# Patient Record
Sex: FEMALE
[De-identification: names, ages, dates, MRNs, and addresses within clinical notes are randomized per-mention and may not be internally consistent; named-entity substitution may affect disease eponyms.]

---

## 2021-11-05 ENCOUNTER — NURSE TRIAGE (OUTPATIENT)
Dept: OTHER | Facility: CLINIC | Age: 71
End: 2021-11-05

## 2021-11-05 NOTE — TELEPHONE ENCOUNTER
Brief description of triage: Patient calling with questions after cortisone injection to her hand yesterday. Stated that she noticed some facial flushing to both cheeks about 24 hours after injection. Facial Flushing - A flushing sensation and redness of the face. This reaction is more common in women and is seen in up to 15 percent of patients. This can begin within a few hours of the injection and may last for a few days. Triage indicates:  Call PCP when office is open. If any worsening of symptoms, call back to speak with nurse. Care advice provided, patient verbalizes understanding; denies any other questions or concerns; instructed to call back for any new or worsening symptoms. This triage is a result of a call to [address] [place] [address]. Please do not respond to the triage nurse through this encounter. Any subsequent communication should be directly with the patient. Reason for Disposition   [1] Caller requesting NON-URGENT health information AND [2] PCP's office is the best resource    Answer Assessment - Initial Assessment Questions  1. REASON FOR CALL or QUESTION: \"What is your reason for calling today? \" or \"How can I best help you? \" or \"What question do you have that I can help answer? \"      How long does facial flushing last with Cortisone injection?     Protocols used: INFORMATION ONLY CALL - NO TRIAGE-ADULT-

## 2023-02-07 PROBLEM — R14.0 BLOATING: Status: ACTIVE | Noted: 2023-02-07

## 2023-02-07 PROBLEM — D35.2 PITUITARY ADENOMA (MULTI): Status: ACTIVE | Noted: 2023-02-07

## 2023-02-07 PROBLEM — G47.00 INSOMNIA: Status: ACTIVE | Noted: 2023-02-07

## 2023-02-07 PROBLEM — R53.83 MALAISE AND FATIGUE: Status: ACTIVE | Noted: 2023-02-07

## 2023-02-07 PROBLEM — E55.9 VITAMIN D DEFICIENCY: Status: ACTIVE | Noted: 2023-02-07

## 2023-02-07 PROBLEM — K80.20 GALL BLADDER STONES: Status: ACTIVE | Noted: 2023-02-07

## 2023-02-07 PROBLEM — R14.2 BELCHING: Status: ACTIVE | Noted: 2023-02-07

## 2023-02-07 PROBLEM — R53.81 MALAISE AND FATIGUE: Status: ACTIVE | Noted: 2023-02-07

## 2023-02-07 PROBLEM — Z85.828 HISTORY OF BASAL CELL CARCINOMA (BCC): Status: ACTIVE | Noted: 2023-02-07

## 2023-02-07 PROBLEM — R10.13 EPIGASTRIC PAIN: Status: ACTIVE | Noted: 2023-02-07

## 2023-02-07 PROBLEM — M18.11 ARTHRITIS OF CARPOMETACARPAL (CMC) JOINT OF RIGHT THUMB: Status: ACTIVE | Noted: 2023-02-07

## 2023-02-07 PROBLEM — M25.531 ACUTE PAIN OF RIGHT WRIST: Status: ACTIVE | Noted: 2023-02-07

## 2023-02-07 PROBLEM — N63.0 BREAST MASS: Status: ACTIVE | Noted: 2023-02-07

## 2023-02-07 PROBLEM — M54.32 LEFT SCIATIC NERVE PAIN: Status: ACTIVE | Noted: 2023-02-07

## 2023-02-07 PROBLEM — R11.0 NAUSEA IN ADULT: Status: ACTIVE | Noted: 2023-02-07

## 2023-02-07 PROBLEM — K21.9 GERD (GASTROESOPHAGEAL REFLUX DISEASE): Status: ACTIVE | Noted: 2023-02-07

## 2023-02-07 PROBLEM — R59.0 CERVICAL LYMPHADENOPATHY: Status: ACTIVE | Noted: 2023-02-07

## 2023-02-07 PROBLEM — N60.19 FIBROCYSTIC DISEASE OF BREAST: Status: ACTIVE | Noted: 2023-02-07

## 2023-02-07 PROBLEM — M75.42 IMPINGEMENT SYNDROME OF LEFT SHOULDER: Status: ACTIVE | Noted: 2023-02-07

## 2023-02-07 PROBLEM — R22.1 NECK MASS: Status: ACTIVE | Noted: 2023-02-07

## 2023-02-07 PROBLEM — M25.559 ARTHRALGIA OF HIP: Status: ACTIVE | Noted: 2023-02-07

## 2023-02-07 PROBLEM — N76.0 VULVOVAGINITIS, ESTROGEN DEFICIENT: Status: ACTIVE | Noted: 2023-02-07

## 2023-02-07 PROBLEM — K76.0 FATTY LIVER DISEASE, NONALCOHOLIC: Status: ACTIVE | Noted: 2023-02-07

## 2023-02-07 PROBLEM — E03.9 HYPOTHYROIDISM: Status: ACTIVE | Noted: 2023-02-07

## 2023-02-07 PROBLEM — J02.9 PHARYNGITIS: Status: ACTIVE | Noted: 2023-02-07

## 2023-02-07 PROBLEM — L29.0 RECTAL ITCHING: Status: ACTIVE | Noted: 2023-02-07

## 2023-02-07 PROBLEM — G50.0 TRIGEMINAL NEURALGIA: Status: ACTIVE | Noted: 2023-02-07

## 2023-02-07 PROBLEM — K82.4 GALLBLADDER POLYP: Status: ACTIVE | Noted: 2023-02-07

## 2023-02-07 PROBLEM — S39.012A LOW BACK STRAIN, INITIAL ENCOUNTER: Status: ACTIVE | Noted: 2023-02-07

## 2023-02-07 PROBLEM — E23.7 ABNORMALITY OF PITUITARY GLAND (MULTI): Status: ACTIVE | Noted: 2023-02-07

## 2023-02-07 PROBLEM — R42 EPISODIC LIGHTHEADEDNESS: Status: ACTIVE | Noted: 2023-02-07

## 2023-02-07 PROBLEM — M25.462 EFFUSION OF LEFT KNEE: Status: ACTIVE | Noted: 2023-02-07

## 2023-02-07 PROBLEM — M85.80 OSTEOPENIA: Status: ACTIVE | Noted: 2023-02-07

## 2023-02-07 PROBLEM — K11.1 SALIVARY GLAND ENLARGEMENT: Status: ACTIVE | Noted: 2023-02-07

## 2023-02-07 PROBLEM — E78.5 HYPERLIPIDEMIA: Status: ACTIVE | Noted: 2023-02-07

## 2023-02-07 PROBLEM — D49.7 PITUITARY TUMOR: Status: ACTIVE | Noted: 2023-02-07

## 2023-02-07 PROBLEM — I25.10 CORONARY ARTERY DISEASE: Status: ACTIVE | Noted: 2023-02-07

## 2023-02-07 PROBLEM — R92.30 DENSE BREAST TISSUE: Status: ACTIVE | Noted: 2023-02-07

## 2023-02-07 PROBLEM — D50.9 ANEMIA, IRON DEFICIENCY: Status: ACTIVE | Noted: 2023-02-07

## 2023-02-07 PROBLEM — M54.41 ACUTE RIGHT-SIDED LOW BACK PAIN WITH RIGHT-SIDED SCIATICA: Status: ACTIVE | Noted: 2023-02-07

## 2023-02-07 PROBLEM — R07.89 CHEST PAIN, ATYPICAL: Status: ACTIVE | Noted: 2023-02-07

## 2023-02-07 PROBLEM — R10.2 PELVIC PAIN IN FEMALE: Status: ACTIVE | Noted: 2023-02-07

## 2023-02-07 PROBLEM — M25.50 ARTHRALGIA: Status: ACTIVE | Noted: 2023-02-07

## 2023-02-07 PROBLEM — U07.1 COVID-19: Status: ACTIVE | Noted: 2023-02-07

## 2023-02-07 RX ORDER — ATORVASTATIN CALCIUM 20 MG/1
20 TABLET, FILM COATED ORAL DAILY
COMMUNITY
Start: 2020-11-17 | End: 2023-09-22 | Stop reason: SDUPTHER

## 2023-02-07 RX ORDER — LEVOTHYROXINE SODIUM 125 UG/1
62.5 TABLET ORAL
COMMUNITY
Start: 2015-12-17 | End: 2023-09-18

## 2023-02-07 RX ORDER — ACETAMINOPHEN 500 MG
100 TABLET ORAL
COMMUNITY
Start: 2013-11-21

## 2023-02-07 RX ORDER — ZOLPIDEM TARTRATE 6.25 MG/1
6.25 TABLET, FILM COATED, EXTENDED RELEASE ORAL NIGHTLY PRN
COMMUNITY
Start: 2020-05-13 | End: 2023-10-14

## 2023-02-07 RX ORDER — NEBIVOLOL 2.5 MG/1
2.5 TABLET ORAL NIGHTLY
COMMUNITY
Start: 2022-01-24 | End: 2024-03-14 | Stop reason: ALTCHOICE

## 2023-04-03 ENCOUNTER — OFFICE VISIT (OUTPATIENT)
Dept: PRIMARY CARE | Facility: CLINIC | Age: 73
End: 2023-04-03
Payer: MEDICARE

## 2023-04-03 VITALS
SYSTOLIC BLOOD PRESSURE: 128 MMHG | RESPIRATION RATE: 19 BRPM | HEART RATE: 68 BPM | DIASTOLIC BLOOD PRESSURE: 70 MMHG | HEIGHT: 63 IN | WEIGHT: 139 LBS | TEMPERATURE: 98.5 F | BODY MASS INDEX: 24.63 KG/M2

## 2023-04-03 DIAGNOSIS — E03.9 ACQUIRED HYPOTHYROIDISM: ICD-10-CM

## 2023-04-03 DIAGNOSIS — N60.19 FIBROCYSTIC BREAST DISEASE (FCBD), UNSPECIFIED LATERALITY: ICD-10-CM

## 2023-04-03 DIAGNOSIS — M85.80 OSTEOPENIA, UNSPECIFIED LOCATION: ICD-10-CM

## 2023-04-03 DIAGNOSIS — Z12.31 ENCOUNTER FOR SCREENING MAMMOGRAM FOR MALIGNANT NEOPLASM OF BREAST: ICD-10-CM

## 2023-04-03 DIAGNOSIS — E78.2 MIXED HYPERLIPIDEMIA: ICD-10-CM

## 2023-04-03 DIAGNOSIS — E55.9 VITAMIN D DEFICIENCY: ICD-10-CM

## 2023-04-03 DIAGNOSIS — Z78.0 POSTMENOPAUSAL: ICD-10-CM

## 2023-04-03 DIAGNOSIS — R51.9 NONINTRACTABLE EPISODIC HEADACHE, UNSPECIFIED HEADACHE TYPE: ICD-10-CM

## 2023-04-03 DIAGNOSIS — R53.83 FATIGUE, UNSPECIFIED TYPE: ICD-10-CM

## 2023-04-03 DIAGNOSIS — K76.0 FATTY LIVER DISEASE, NONALCOHOLIC: ICD-10-CM

## 2023-04-03 DIAGNOSIS — Z00.00 MEDICARE ANNUAL WELLNESS VISIT, SUBSEQUENT: Primary | ICD-10-CM

## 2023-04-03 DIAGNOSIS — I25.10 CORONARY ARTERY DISEASE INVOLVING NATIVE CORONARY ARTERY OF NATIVE HEART WITHOUT ANGINA PECTORIS: ICD-10-CM

## 2023-04-03 DIAGNOSIS — F51.04 PSYCHOPHYSIOLOGICAL INSOMNIA: ICD-10-CM

## 2023-04-03 DIAGNOSIS — D35.2 PITUITARY ADENOMA (MULTI): ICD-10-CM

## 2023-04-03 PROBLEM — R10.2 PELVIC PAIN IN FEMALE: Status: RESOLVED | Noted: 2023-02-07 | Resolved: 2023-04-03

## 2023-04-03 PROBLEM — M25.559 ARTHRALGIA OF HIP: Status: RESOLVED | Noted: 2023-02-07 | Resolved: 2023-04-03

## 2023-04-03 PROBLEM — M54.41 ACUTE RIGHT-SIDED LOW BACK PAIN WITH RIGHT-SIDED SCIATICA: Status: RESOLVED | Noted: 2023-02-07 | Resolved: 2023-04-03

## 2023-04-03 PROBLEM — R59.0 CERVICAL LYMPHADENOPATHY: Status: RESOLVED | Noted: 2023-02-07 | Resolved: 2023-04-03

## 2023-04-03 PROBLEM — R14.0 BLOATING: Status: RESOLVED | Noted: 2023-02-07 | Resolved: 2023-04-03

## 2023-04-03 PROBLEM — M54.32 LEFT SCIATIC NERVE PAIN: Status: RESOLVED | Noted: 2023-02-07 | Resolved: 2023-04-03

## 2023-04-03 PROBLEM — R53.81 MALAISE AND FATIGUE: Status: RESOLVED | Noted: 2023-02-07 | Resolved: 2023-04-03

## 2023-04-03 PROBLEM — G50.0 TRIGEMINAL NEURALGIA: Status: RESOLVED | Noted: 2023-02-07 | Resolved: 2023-04-03

## 2023-04-03 PROBLEM — N76.0 VULVOVAGINITIS, ESTROGEN DEFICIENT: Status: RESOLVED | Noted: 2023-02-07 | Resolved: 2023-04-03

## 2023-04-03 PROBLEM — R14.2 BELCHING: Status: RESOLVED | Noted: 2023-02-07 | Resolved: 2023-04-03

## 2023-04-03 PROBLEM — K80.20 GALL BLADDER STONES: Status: RESOLVED | Noted: 2023-02-07 | Resolved: 2023-04-03

## 2023-04-03 PROBLEM — U07.1 COVID-19: Status: RESOLVED | Noted: 2023-02-07 | Resolved: 2023-04-03

## 2023-04-03 PROBLEM — R11.0 NAUSEA IN ADULT: Status: RESOLVED | Noted: 2023-02-07 | Resolved: 2023-04-03

## 2023-04-03 PROBLEM — R10.13 EPIGASTRIC PAIN: Status: RESOLVED | Noted: 2023-02-07 | Resolved: 2023-04-03

## 2023-04-03 PROBLEM — M75.42 IMPINGEMENT SYNDROME OF LEFT SHOULDER: Status: RESOLVED | Noted: 2023-02-07 | Resolved: 2023-04-03

## 2023-04-03 PROBLEM — E23.7 ABNORMALITY OF PITUITARY GLAND (MULTI): Status: RESOLVED | Noted: 2023-02-07 | Resolved: 2023-04-03

## 2023-04-03 PROBLEM — K11.1 SALIVARY GLAND ENLARGEMENT: Status: RESOLVED | Noted: 2023-02-07 | Resolved: 2023-04-03

## 2023-04-03 PROBLEM — M25.531 ACUTE PAIN OF RIGHT WRIST: Status: RESOLVED | Noted: 2023-02-07 | Resolved: 2023-04-03

## 2023-04-03 PROBLEM — L29.0 RECTAL ITCHING: Status: RESOLVED | Noted: 2023-02-07 | Resolved: 2023-04-03

## 2023-04-03 PROBLEM — M25.50 ARTHRALGIA: Status: RESOLVED | Noted: 2023-02-07 | Resolved: 2023-04-03

## 2023-04-03 PROBLEM — R22.1 NECK MASS: Status: RESOLVED | Noted: 2023-02-07 | Resolved: 2023-04-03

## 2023-04-03 PROBLEM — R07.89 CHEST PAIN, ATYPICAL: Status: RESOLVED | Noted: 2023-02-07 | Resolved: 2023-04-03

## 2023-04-03 PROBLEM — D50.9 ANEMIA, IRON DEFICIENCY: Status: RESOLVED | Noted: 2023-02-07 | Resolved: 2023-04-03

## 2023-04-03 PROBLEM — K21.9 GERD (GASTROESOPHAGEAL REFLUX DISEASE): Status: RESOLVED | Noted: 2023-02-07 | Resolved: 2023-04-03

## 2023-04-03 PROBLEM — S39.012A LOW BACK STRAIN, INITIAL ENCOUNTER: Status: RESOLVED | Noted: 2023-02-07 | Resolved: 2023-04-03

## 2023-04-03 PROBLEM — J02.9 PHARYNGITIS: Status: RESOLVED | Noted: 2023-02-07 | Resolved: 2023-04-03

## 2023-04-03 PROCEDURE — 1159F MED LIST DOCD IN RCRD: CPT | Performed by: INTERNAL MEDICINE

## 2023-04-03 PROCEDURE — 1160F RVW MEDS BY RX/DR IN RCRD: CPT | Performed by: INTERNAL MEDICINE

## 2023-04-03 PROCEDURE — 82607 VITAMIN B-12: CPT

## 2023-04-03 PROCEDURE — 80061 LIPID PANEL: CPT

## 2023-04-03 PROCEDURE — 81003 URINALYSIS AUTO W/O SCOPE: CPT

## 2023-04-03 PROCEDURE — 1036F TOBACCO NON-USER: CPT | Performed by: INTERNAL MEDICINE

## 2023-04-03 PROCEDURE — 36415 COLL VENOUS BLD VENIPUNCTURE: CPT | Performed by: INTERNAL MEDICINE

## 2023-04-03 PROCEDURE — 1170F FXNL STATUS ASSESSED: CPT | Performed by: INTERNAL MEDICINE

## 2023-04-03 PROCEDURE — 80053 COMPREHEN METABOLIC PANEL: CPT

## 2023-04-03 PROCEDURE — 82306 VITAMIN D 25 HYDROXY: CPT

## 2023-04-03 PROCEDURE — 84443 ASSAY THYROID STIM HORMONE: CPT

## 2023-04-03 PROCEDURE — 85027 COMPLETE CBC AUTOMATED: CPT

## 2023-04-03 PROCEDURE — G0439 PPPS, SUBSEQ VISIT: HCPCS | Performed by: INTERNAL MEDICINE

## 2023-04-03 PROCEDURE — 99214 OFFICE O/P EST MOD 30 MIN: CPT | Performed by: INTERNAL MEDICINE

## 2023-04-03 ASSESSMENT — ENCOUNTER SYMPTOMS
WEAKNESS: 0
ANAL BLEEDING: 0
BACK PAIN: 0
ACTIVITY CHANGE: 0
COUGH: 0
DYSURIA: 0
PHOTOPHOBIA: 0
STRIDOR: 0
FREQUENCY: 0
DIZZINESS: 0
TROUBLE SWALLOWING: 0
HYPERACTIVE: 0
EYE DISCHARGE: 0
HEMATURIA: 0
ABDOMINAL PAIN: 0
CONFUSION: 0
DEPRESSION: 0
LOSS OF SENSATION IN FEET: 0
COLOR CHANGE: 0
SINUS PAIN: 0
CHOKING: 0
POLYDIPSIA: 0
ADENOPATHY: 0
FACIAL SWELLING: 0
UNEXPECTED WEIGHT CHANGE: 0
BRUISES/BLEEDS EASILY: 0
VOICE CHANGE: 0
NAUSEA: 0
SINUS PRESSURE: 0
FLANK PAIN: 0
EYE REDNESS: 0
DYSPHORIC MOOD: 0
FATIGUE: 1
JOINT SWELLING: 0
WOUND: 0
CHILLS: 0
APPETITE CHANGE: 0
DIARRHEA: 0
ARTHRALGIAS: 0
HEADACHES: 1
LIGHT-HEADEDNESS: 0
POLYPHAGIA: 0
EYE ITCHING: 0
RHINORRHEA: 0
CONSTIPATION: 0
OCCASIONAL FEELINGS OF UNSTEADINESS: 0
EYE PAIN: 0
WHEEZING: 0
NUMBNESS: 0
MYALGIAS: 0
BLOOD IN STOOL: 0
DIFFICULTY URINATING: 0
FEVER: 0
PALPITATIONS: 0
CHEST TIGHTNESS: 0
DECREASED CONCENTRATION: 0
SEIZURES: 0
APNEA: 0
SLEEP DISTURBANCE: 0
ABDOMINAL DISTENTION: 0
NERVOUS/ANXIOUS: 0
SHORTNESS OF BREATH: 0
VOMITING: 0
DIAPHORESIS: 0
RECTAL PAIN: 0
SORE THROAT: 0
NECK PAIN: 0

## 2023-04-03 ASSESSMENT — PATIENT HEALTH QUESTIONNAIRE - PHQ9
1. LITTLE INTEREST OR PLEASURE IN DOING THINGS: NOT AT ALL
2. FEELING DOWN, DEPRESSED OR HOPELESS: NOT AT ALL
SUM OF ALL RESPONSES TO PHQ9 QUESTIONS 1 AND 2: 0

## 2023-04-03 ASSESSMENT — ACTIVITIES OF DAILY LIVING (ADL)
DOING_HOUSEWORK: INDEPENDENT
BATHING: INDEPENDENT
GROCERY_SHOPPING: INDEPENDENT
MANAGING_FINANCES: INDEPENDENT
DRESSING: INDEPENDENT
TAKING_MEDICATION: INDEPENDENT

## 2023-04-03 NOTE — PATIENT INSTRUCTIONS
Try Melatonin 2.5 or 5 mg when you wake at 2:30 in the AM to get you back to sleep.    Can stop Bystolic and monitor your BP at home with goal of <140/90 consistently   We did blood work today and will call if anything is abnormal  Your bone density is due in 10/2023-order is already in   Get your mammogram done when able-order is in  See GYN as scheduled  Continue other meds as directed-call when refills needed  Follow up in 1 year-sooner if needed

## 2023-04-03 NOTE — PROGRESS NOTES
Subjective   Reason for Visit: Umu Haque is an 72 y.o. female here for a Medicare Wellness visit.          Reviewed all medications by prescribing practitioner or clinical pharmacist (such as prescriptions, OTCs, herbal therapies and supplements) and documented in the medical record.    HPI    Pt here for MWV.  She feels her energy is low.  She continues to have insomnia and only uses   Ambien every now and then.  She cannot stay asleep.  She will wake 2:30-3 am and stay up for hours at times.      She had mammogram in 4/2022 that was normal.   She has no breast or urinary issues.  She has no pelvic or urinary issues at this time.  She has an upcoming appointment with Dr. Rain for her GYN care (summer of 2023).    She had her colonoscopy done in 7/2021 that showed diverticuli of the ascending colon.   She is moving bowels without issues.  No current GERD.      She had bone density in 10/2021 that showed osteopenia.  She is on Vitamin D but not calcium.  She is very active.  No falls.      She sees Derm regularly and had recent exam with some removal of skin lesions.      She has been having almost a constant pressure sensation above and below right eye for last 2 months.  No vision or auditory changes.  She does not have sinus pain/pressure or symptoms.  She does not take medication for the discomfort.        Patient Care Team:  Liyah QUEZADA DO as PCP - General  Liyah QUEZADA DO as PCP - [facility] Attributed Provider     Review of Systems   Constitutional:  Positive for fatigue. Negative for activity change, appetite change, chills, diaphoresis, fever and unexpected weight change.   HENT:  Negative for congestion, dental problem, ear discharge, ear pain, facial swelling, hearing loss, nosebleeds, postnasal drip, rhinorrhea, sinus pressure, sinus pain, sneezing, sore throat, tinnitus, trouble swallowing and voice change.    Eyes:  Negative for photophobia, pain, discharge, redness, itching and visual  "disturbance.   Respiratory:  Negative for apnea, cough, choking, chest tightness, shortness of breath, wheezing and stridor.    Cardiovascular:  Negative for chest pain, palpitations and leg swelling.   Gastrointestinal:  Negative for abdominal distention, abdominal pain, anal bleeding, blood in stool, constipation, diarrhea, nausea, rectal pain and vomiting.   Endocrine: Negative for cold intolerance, heat intolerance, polydipsia, polyphagia and polyuria.   Genitourinary:  Negative for decreased urine volume, difficulty urinating, dysuria, flank pain, frequency, genital sores, hematuria, pelvic pain, urgency, vaginal bleeding, vaginal discharge and vaginal pain.   Musculoskeletal:  Negative for arthralgias, back pain, joint swelling, myalgias and neck pain.   Skin:  Negative for color change, rash and wound.   Allergic/Immunologic: Negative for environmental allergies.   Neurological:  Positive for headaches. Negative for dizziness, seizures, syncope, weakness, light-headedness and numbness.   Hematological:  Negative for adenopathy. Does not bruise/bleed easily.   Psychiatric/Behavioral:  Negative for confusion, decreased concentration, dysphoric mood and sleep disturbance. The patient is not nervous/anxious and is not hyperactive.        Objective   Vitals:  /70   Pulse 68   Temp 36.9 °C (98.5 °F)   Resp 19   Ht 1.6 m (5' 3\")   Wt 63 kg (139 lb)   BMI 24.62 kg/m²       Physical Exam  Constitutional:       Appearance: Normal appearance.   HENT:      Head: Normocephalic and atraumatic.      Right Ear: Tympanic membrane, ear canal and external ear normal. There is no impacted cerumen.      Left Ear: Tympanic membrane, ear canal and external ear normal. There is no impacted cerumen.      Nose: Nose normal. No congestion or rhinorrhea.      Mouth/Throat:      Mouth: Mucous membranes are moist.      Pharynx: Oropharynx is clear. No oropharyngeal exudate or posterior oropharyngeal erythema.   Eyes:      " Extraocular Movements: Extraocular movements intact.      Conjunctiva/sclera: Conjunctivae normal.      Pupils: Pupils are equal, round, and reactive to light.   Neck:      Vascular: No carotid bruit.   Cardiovascular:      Rate and Rhythm: Normal rate and regular rhythm.      Pulses: Normal pulses.      Heart sounds: Normal heart sounds. No murmur heard.  Pulmonary:      Effort: Pulmonary effort is normal. No respiratory distress.      Breath sounds: Normal breath sounds. No wheezing, rhonchi or rales.   Abdominal:      General: Abdomen is flat. Bowel sounds are normal. There is no distension.      Palpations: Abdomen is soft.      Tenderness: There is no abdominal tenderness.      Hernia: No hernia is present.   Musculoskeletal:         General: No swelling or tenderness. Normal range of motion.      Cervical back: Normal range of motion and neck supple.      Right lower leg: No edema.      Left lower leg: No edema.   Lymphadenopathy:      Cervical: No cervical adenopathy.   Skin:     General: Skin is warm and dry.      Findings: No lesion or rash.   Neurological:      General: No focal deficit present.      Mental Status: She is alert and oriented to person, place, and time.      Cranial Nerves: No cranial nerve deficit.      Sensory: No sensory deficit.      Motor: No weakness.   Psychiatric:         Mood and Affect: Mood normal.         Behavior: Behavior normal.         Thought Content: Thought content normal.         Judgment: Judgment normal.         Assessment/Plan   Problem List Items Addressed This Visit          Nervous    Insomnia    Current Assessment & Plan     Uses Ambien very PRN  Still getting up in middle of night  Recommend use of Melatonin 2.5-5 mg when getting up in middle of night to help her get back to sleep          Pituitary adenoma (CMS/HCC)    Current Assessment & Plan     Follows with neurosurgeon  Has had multiple MRI's that are stable-most recently told no more MRI's needed              Circulatory    Coronary artery disease    Relevant Orders    CBC    Comprehensive Metabolic Panel    Urinalysis with Reflex Microscopic    Follow Up In Primary Care       Digestive    Fatty liver disease, nonalcoholic    Relevant Orders    Follow Up In Primary Care       Musculoskeletal    Osteopenia    Current Assessment & Plan     On Vitamin D   Due for repeat in 10/2023  Exercises regularly             Endocrine/Metabolic    Hypothyroidism    Current Assessment & Plan     On medication   Repeat TSH today         Relevant Orders    Thyroid Stimulating Hormone    Follow Up In Primary Care    Vitamin D deficiency    Relevant Orders    Vitamin D, Total    Follow Up In Primary Care    Vitamin D 25-Hydroxy,Total (for eval of Vitamin D levels)       Other    Fibrocystic disease of breast    Hyperlipidemia    Relevant Orders    Lipid Panel     Other Visit Diagnoses       Medicare annual wellness visit, subsequent    -  Primary    Relevant Orders    Follow Up In Primary Care    Fatigue, unspecified type        Relevant Orders    Vitamin B12    Postmenopausal        Relevant Orders    XR DEXA bone density    Encounter for screening mammogram for malignant neoplasm of breast        Relevant Orders    BI mammo bilateral screening tomosynthesis    Nonintractable episodic headache, unspecified headache type

## 2023-04-03 NOTE — ASSESSMENT & PLAN NOTE
Uses Ambien very PRN  Still getting up in middle of night  Recommend use of Melatonin 2.5-5 mg when getting up in middle of night to help her get back to sleep

## 2023-04-03 NOTE — ASSESSMENT & PLAN NOTE
Follows with neurosurgeon  Has had multiple MRI's that are stable-most recently told no more MRI's needed

## 2023-04-04 LAB
ALANINE AMINOTRANSFERASE (SGPT) (U/L) IN SER/PLAS: 18 U/L (ref 7–45)
ALBUMIN (G/DL) IN SER/PLAS: 4.4 G/DL (ref 3.4–5)
ALKALINE PHOSPHATASE (U/L) IN SER/PLAS: 70 U/L (ref 33–136)
ANION GAP IN SER/PLAS: 13 MMOL/L (ref 10–20)
APPEARANCE, URINE: CLEAR
ASPARTATE AMINOTRANSFERASE (SGOT) (U/L) IN SER/PLAS: 21 U/L (ref 9–39)
BILIRUBIN TOTAL (MG/DL) IN SER/PLAS: 0.9 MG/DL (ref 0–1.2)
BILIRUBIN, URINE: NEGATIVE
BLOOD, URINE: NEGATIVE
CALCIDIOL (25 OH VITAMIN D3) (NG/ML) IN SER/PLAS: 45 NG/ML
CALCIUM (MG/DL) IN SER/PLAS: 9.7 MG/DL (ref 8.6–10.6)
CARBON DIOXIDE, TOTAL (MMOL/L) IN SER/PLAS: 29 MMOL/L (ref 21–32)
CHLORIDE (MMOL/L) IN SER/PLAS: 104 MMOL/L (ref 98–107)
CHOLESTEROL (MG/DL) IN SER/PLAS: 161 MG/DL (ref 0–199)
CHOLESTEROL IN HDL (MG/DL) IN SER/PLAS: 66.9 MG/DL
CHOLESTEROL/HDL RATIO: 2.4
COBALAMIN (VITAMIN B12) (PG/ML) IN SER/PLAS: 324 PG/ML (ref 211–911)
COLOR, URINE: NORMAL
CREATININE (MG/DL) IN SER/PLAS: 0.75 MG/DL (ref 0.5–1.05)
ERYTHROCYTE DISTRIBUTION WIDTH (RATIO) BY AUTOMATED COUNT: 12.3 % (ref 11.5–14.5)
ERYTHROCYTE MEAN CORPUSCULAR HEMOGLOBIN CONCENTRATION (G/DL) BY AUTOMATED: 32.3 G/DL (ref 32–36)
ERYTHROCYTE MEAN CORPUSCULAR VOLUME (FL) BY AUTOMATED COUNT: 99 FL (ref 80–100)
ERYTHROCYTES (10*6/UL) IN BLOOD BY AUTOMATED COUNT: 4.37 X10E12/L (ref 4–5.2)
GFR FEMALE: 84 ML/MIN/1.73M2
GLUCOSE (MG/DL) IN SER/PLAS: 96 MG/DL (ref 74–99)
GLUCOSE, URINE: NEGATIVE MG/DL
HEMATOCRIT (%) IN BLOOD BY AUTOMATED COUNT: 43.4 % (ref 36–46)
HEMOGLOBIN (G/DL) IN BLOOD: 14 G/DL (ref 12–16)
KETONES, URINE: NEGATIVE MG/DL
LDL: 70 MG/DL (ref 0–99)
LEUKOCYTE ESTERASE, URINE: NEGATIVE
LEUKOCYTES (10*3/UL) IN BLOOD BY AUTOMATED COUNT: 4.3 X10E9/L (ref 4.4–11.3)
NITRITE, URINE: NEGATIVE
NRBC (PER 100 WBCS) BY AUTOMATED COUNT: 0 /100 WBC (ref 0–0)
PH, URINE: 7 (ref 5–8)
PLATELETS (10*3/UL) IN BLOOD AUTOMATED COUNT: 183 X10E9/L (ref 150–450)
POTASSIUM (MMOL/L) IN SER/PLAS: 5 MMOL/L (ref 3.5–5.3)
PROTEIN TOTAL: 6.5 G/DL (ref 6.4–8.2)
PROTEIN, URINE: NEGATIVE MG/DL
SODIUM (MMOL/L) IN SER/PLAS: 141 MMOL/L (ref 136–145)
SPECIFIC GRAVITY, URINE: 1 (ref 1–1.03)
THYROTROPIN (MIU/L) IN SER/PLAS BY DETECTION LIMIT <= 0.05 MIU/L: 2.02 MIU/L (ref 0.44–3.98)
TRIGLYCERIDE (MG/DL) IN SER/PLAS: 119 MG/DL (ref 0–149)
UREA NITROGEN (MG/DL) IN SER/PLAS: 14 MG/DL (ref 6–23)
UROBILINOGEN, URINE: <2 MG/DL (ref 0–1.9)
VLDL: 24 MG/DL (ref 0–40)

## 2023-09-18 DIAGNOSIS — E03.9 ACQUIRED HYPOTHYROIDISM: ICD-10-CM

## 2023-09-18 RX ORDER — LEVOTHYROXINE SODIUM 125 UG/1
62.5 TABLET ORAL DAILY
Qty: 45 TABLET | Refills: 1 | Status: SHIPPED | OUTPATIENT
Start: 2023-09-18 | End: 2023-09-22 | Stop reason: SDUPTHER

## 2023-09-22 DIAGNOSIS — E03.9 ACQUIRED HYPOTHYROIDISM: ICD-10-CM

## 2023-09-22 DIAGNOSIS — E78.5 HYPERLIPIDEMIA, UNSPECIFIED HYPERLIPIDEMIA TYPE: ICD-10-CM

## 2023-09-22 RX ORDER — ATORVASTATIN CALCIUM 20 MG/1
20 TABLET, FILM COATED ORAL DAILY
Qty: 90 TABLET | Refills: 1 | Status: SHIPPED | OUTPATIENT
Start: 2023-09-22 | End: 2024-04-03 | Stop reason: SDUPTHER

## 2023-09-22 RX ORDER — LEVOTHYROXINE SODIUM 125 UG/1
62.5 TABLET ORAL DAILY
Qty: 45 TABLET | Refills: 1 | Status: SHIPPED | OUTPATIENT
Start: 2023-09-22 | End: 2024-03-25 | Stop reason: SDUPTHER

## 2023-10-13 ENCOUNTER — TELEPHONE (OUTPATIENT)
Dept: PRIMARY CARE | Facility: CLINIC | Age: 73
End: 2023-10-13
Payer: MEDICARE

## 2023-10-13 DIAGNOSIS — G47.00 INSOMNIA, UNSPECIFIED TYPE: Primary | ICD-10-CM

## 2023-10-13 NOTE — TELEPHONE ENCOUNTER
Patient called for a Zolpidem refill. She said you had mentioned calling in a new dose of 1/2 tablet of the 6.25 and without extended release so she can use as needed in the middle of the night.

## 2023-10-14 RX ORDER — ZOLPIDEM TARTRATE 5 MG/1
5 TABLET ORAL NIGHTLY PRN
Qty: 45 TABLET | Refills: 0 | Status: SHIPPED | OUTPATIENT
Start: 2023-10-14 | End: 2023-12-13

## 2023-10-14 NOTE — TELEPHONE ENCOUNTER
I sent in the 5 mg dose instead and she can take 1 tablet before bed and then 1/2 tablet in middle of night if needed

## 2023-10-23 ENCOUNTER — ANCILLARY PROCEDURE (OUTPATIENT)
Dept: RADIOLOGY | Facility: CLINIC | Age: 73
End: 2023-10-23
Payer: MEDICARE

## 2023-10-23 DIAGNOSIS — Z78.0 ASYMPTOMATIC MENOPAUSAL STATE: ICD-10-CM

## 2023-10-23 PROCEDURE — 77080 DXA BONE DENSITY AXIAL: CPT

## 2023-10-23 PROCEDURE — 77080 DXA BONE DENSITY AXIAL: CPT | Performed by: RADIOLOGY

## 2024-02-14 ENCOUNTER — TELEPHONE (OUTPATIENT)
Dept: PRIMARY CARE | Facility: CLINIC | Age: 74
End: 2024-02-14
Payer: MEDICARE

## 2024-02-14 DIAGNOSIS — F51.01 PRIMARY INSOMNIA: Primary | ICD-10-CM

## 2024-02-14 NOTE — TELEPHONE ENCOUNTER
Umu called today for a suggestion to a sleep specialist for her insomnia. She said you have discussed with her before.     Please advise

## 2024-02-14 NOTE — TELEPHONE ENCOUNTER
Umu did try Dr Moore however she Is not excepting new patients can you enter a referral and she will call to schedule.

## 2024-03-14 ENCOUNTER — OFFICE VISIT (OUTPATIENT)
Dept: PRIMARY CARE | Facility: CLINIC | Age: 74
End: 2024-03-14
Payer: MEDICARE

## 2024-03-14 VITALS
BODY MASS INDEX: 24.92 KG/M2 | WEIGHT: 145.2 LBS | HEART RATE: 76 BPM | DIASTOLIC BLOOD PRESSURE: 77 MMHG | SYSTOLIC BLOOD PRESSURE: 136 MMHG

## 2024-03-14 DIAGNOSIS — R26.89 BALANCE PROBLEM: Primary | ICD-10-CM

## 2024-03-14 PROCEDURE — 1159F MED LIST DOCD IN RCRD: CPT | Performed by: INTERNAL MEDICINE

## 2024-03-14 PROCEDURE — 1036F TOBACCO NON-USER: CPT | Performed by: INTERNAL MEDICINE

## 2024-03-14 PROCEDURE — 1160F RVW MEDS BY RX/DR IN RCRD: CPT | Performed by: INTERNAL MEDICINE

## 2024-03-14 PROCEDURE — 99213 OFFICE O/P EST LOW 20 MIN: CPT | Performed by: INTERNAL MEDICINE

## 2024-03-14 RX ORDER — ELECTROLYTES/DEXTROSE
1 SOLUTION, ORAL ORAL
COMMUNITY
End: 2024-04-03 | Stop reason: WASHOUT

## 2024-03-14 RX ORDER — LANOLIN ALCOHOL/MO/W.PET/CERES
1000 CREAM (GRAM) TOPICAL DAILY
COMMUNITY

## 2024-03-14 ASSESSMENT — ENCOUNTER SYMPTOMS
SPEECH DIFFICULTY: 0
DYSPHORIC MOOD: 0
BRUISES/BLEEDS EASILY: 0
HYPERACTIVE: 0
CONFUSION: 0
NUMBNESS: 0
FACIAL ASYMMETRY: 0
AGITATION: 0
RHINORRHEA: 0
HEADACHES: 0
DECREASED CONCENTRATION: 0
ADENOPATHY: 0
TROUBLE SWALLOWING: 0
HALLUCINATIONS: 0
VOICE CHANGE: 0
SINUS PRESSURE: 0
WEAKNESS: 0
TREMORS: 0
SEIZURES: 0
SINUS PAIN: 0
LIGHT-HEADEDNESS: 0
SLEEP DISTURBANCE: 0
SORE THROAT: 0
NERVOUS/ANXIOUS: 0
DIZZINESS: 0

## 2024-03-14 NOTE — PATIENT INSTRUCTIONS
Symptoms and exam consistent with BPPV  Would get into vestibular PT and see if this helps  Keep ENT appointment for now   Watch salt and caffeine in your diet as these can cause worsening issues  Watch sinuses-if you feel congested this can make it worse-would use nasal spray such as Flonase  Follow up when scheduled     
12-Apr-2021 14:59

## 2024-03-14 NOTE — ASSESSMENT & PLAN NOTE
Most likely BPPV  Recommend vestibular PT  Can use nasal spray to help with any congestion  Has ENT appt scheduled late April

## 2024-03-14 NOTE — PROGRESS NOTES
"Subjective   Patient ID: Umu Haque is a 73 y.o. female who presents for No chief complaint on file..    HPI  Pt here in acute visit.  She has felt off balanced in the last 1-2 months.  She will walk and bump into a wall.  More likely she will drift to the right but something she has noted to the left.  The feeling is quick and fleeting.  She compares it to feeling slightly \"drunk\".  She notes this sensation when walking and turning or when her head is down.  No headaches, vertigo or lightheaded feeling.  She did have a bunch of dental work between 12/2023-1/2024.  She was on medrol dose pack/abx.  She was on amoxicillin first then metronidazole as well as cipro-that ended 1/29/24.  She feels the symptoms have come on since that time she believes.  She did schedule an ENT appointment but this is not till April.    She saw her cardiologist a few weeks ago and he suggested her stop the atorvastatin which she did for 3 full weeks.  She didn't notice any changes in symptoms so she has since restarted it.  She has BP readings as low as 105-120's.  She has element of white coat syndrome when in office.      Review of Systems   HENT:  Positive for dental problem. Negative for congestion, drooling, ear discharge, ear pain, hearing loss, nosebleeds, postnasal drip, rhinorrhea, sinus pressure, sinus pain, sneezing, sore throat, tinnitus, trouble swallowing and voice change.    Neurological:  Negative for dizziness, tremors, seizures, syncope, facial asymmetry, speech difficulty, weakness, light-headedness, numbness and headaches.        Balance changes    Hematological:  Negative for adenopathy. Does not bruise/bleed easily.   Psychiatric/Behavioral:  Negative for agitation, behavioral problems, confusion, decreased concentration, dysphoric mood, hallucinations, self-injury, sleep disturbance and suicidal ideas. The patient is not nervous/anxious and is not hyperactive.        Objective   /77 Comment: home  " Pulse 76   Wt 65.9 kg (145 lb 3.2 oz)   BMI 24.92 kg/m²    Physical Exam  Constitutional:       Appearance: Normal appearance.   HENT:      Right Ear: Tympanic membrane normal.      Left Ear: Tympanic membrane normal.      Nose: No congestion or rhinorrhea.      Mouth/Throat:      Pharynx: Oropharynx is clear. No oropharyngeal exudate or posterior oropharyngeal erythema.   Neck:      Vascular: No carotid bruit.   Cardiovascular:      Rate and Rhythm: Normal rate and regular rhythm.      Pulses: Normal pulses.      Heart sounds: Normal heart sounds.   Pulmonary:      Effort: Pulmonary effort is normal. No respiratory distress.      Breath sounds: Normal breath sounds. No stridor. No wheezing, rhonchi or rales.   Chest:      Chest wall: No tenderness.   Musculoskeletal:         General: No swelling, tenderness, deformity or signs of injury. Normal range of motion.      Cervical back: Normal range of motion and neck supple. No rigidity or tenderness.      Right lower leg: No edema.      Left lower leg: No edema.   Lymphadenopathy:      Cervical: No cervical adenopathy.   Neurological:      General: No focal deficit present.      Mental Status: She is alert and oriented to person, place, and time. Mental status is at baseline.      Cranial Nerves: No cranial nerve deficit.      Sensory: No sensory deficit.      Motor: No weakness.      Coordination: Coordination normal.      Gait: Gait normal.      Deep Tendon Reflexes: Reflexes normal.      Comments: [place] symptoms noted-mild in intensity when head was straight and turned to the left   Negative Rhomberg    Psychiatric:         Mood and Affect: Mood normal.         Assessment/Plan   Problem List Items Addressed This Visit       Balance problem - Primary     Most likely BPPV  Recommend vestibular PT  Can use nasal spray to help with any congestion  Has ENT appt scheduled late April            Relevant Orders    Referral to Physical Therapy

## 2024-03-21 ENCOUNTER — CLINICAL SUPPORT (OUTPATIENT)
Dept: AUDIOLOGY | Facility: CLINIC | Age: 74
End: 2024-03-21
Payer: MEDICARE

## 2024-03-21 DIAGNOSIS — R42 DIZZINESS AND GIDDINESS: Primary | ICD-10-CM

## 2024-03-21 DIAGNOSIS — H91.92 HIGH FREQUENCY HEARING LOSS, LEFT: ICD-10-CM

## 2024-03-21 PROCEDURE — 92557 COMPREHENSIVE HEARING TEST: CPT | Performed by: AUDIOLOGIST

## 2024-03-21 PROCEDURE — 92550 TYMPANOMETRY & REFLEX THRESH: CPT | Performed by: AUDIOLOGIST

## 2024-03-21 NOTE — PROGRESS NOTES
"AUDIOLOGY ADULT AUDIOMETRIC EVALUATION      Name:  Umu Haque  :  1950  Age:  73 y.o.  Date of Evaluation:  3/21/2024    HISTORY  Reason for visit:  imbalance  Ms. Haque is seen 3/21/2024 at the request of Luis A Avila M.D. for an evaluation of hearing.  (Seeing Dr. Avila 2024.)      Chief complaint:    - Dizziness  - diagnosed with benign paroxysmal positional vertigo    Hearing loss:  no concerns; right possibly better  Tinnitus:   denies  Otitis Media: denies   Otologic surgical history:  denies  Dizziness/imbalance:  imbalance for about 2 months; bumping into walls; drifts to the side (usually the right).  Noticed when walking, turning, when head is down  Otalgia:  denies  Ear pressure/fullness:  denies  History of excessive noise exposure:  denies   Other: - treated with antibiotics including Cipro recently after dental surgery (surgery in December; Cipro in January)  - right ear dry, mild itching  - hypothyroid; high cholesterol    Hearing aid history: none         EVALUATION  Please find audiogram in \"Media\" tab (Document Type:  Audiology Report) or included at the bottom of this note.    RESULTS   Otoscopic Evaluation: clear canals bilaterally      Immittance Measures (226 Hz probe tone):   Tympanometry is consistent with normal middle ear pressure and normal tympanic membrane mobility bilaterally.       Ipsilateral acoustic reflexes were present for 500-4000 Hz bilaterally.      Test technique:  standard behavioral technique via TDH earphones (checked with insert earphones).  Reliability is good.    Pure Tone Audiometry:    Right ear:  Hearing sensitivity is within normal limits for 250-8000 Hz.    Left ear: Hearing sensitivity is in the normal hearing to moderate hearing loss range.    Note asymmetry for 8000 Hz (left worse than right)     Speech Audiometry:        Right Ear:  Speech Reception Threshold (SRT) was obtained at 5 dBHL                 Speech discrimination " score was 100% in quiet when words were presented at 55 dBHL (10 item NU-6 ordered-by-difficulty list).        Left Ear:  Speech Reception Threshold (SRT) was obtained at 15 dBHL                 Speech discrimination score was 100% in quiet when words were presented at 65 dBHL (10 item NU-6 ordered-by-difficulty list).      IMPRESSIONS:  Patient is not expected to experience unusual hearing difficulty.      RECOMMENDATIONS  Continue with otologic follow-up with Luis A Avila M.D.   Reassess hearing in 1 year (or sooner if medically indicated or if there is a concern for a change in hearing).    Continue with medical follow-up as indicated.       PATIENT EDUCATION  Discussed results and recommendations with patient.  Questions were addressed and the patient was encouraged to contact our department should concerns arise.       MALDONADO Doherty, [facility]  Licensed Audiologist

## 2024-03-25 DIAGNOSIS — E03.9 ACQUIRED HYPOTHYROIDISM: ICD-10-CM

## 2024-03-25 RX ORDER — LEVOTHYROXINE SODIUM 125 UG/1
62.5 TABLET ORAL DAILY
Qty: 45 TABLET | Refills: 1 | Status: SHIPPED | OUTPATIENT
Start: 2024-03-25 | End: 2024-04-03 | Stop reason: SDUPTHER

## 2024-04-03 ENCOUNTER — OFFICE VISIT (OUTPATIENT)
Dept: PRIMARY CARE | Facility: CLINIC | Age: 74
End: 2024-04-03
Payer: MEDICARE

## 2024-04-03 ENCOUNTER — CLINICAL SUPPORT (OUTPATIENT)
Dept: PHYSICAL THERAPY | Facility: CLINIC | Age: 74
End: 2024-04-03
Payer: MEDICARE

## 2024-04-03 VITALS
BODY MASS INDEX: 25.12 KG/M2 | WEIGHT: 141.8 LBS | SYSTOLIC BLOOD PRESSURE: 128 MMHG | DIASTOLIC BLOOD PRESSURE: 86 MMHG | HEIGHT: 63 IN | RESPIRATION RATE: 16 BRPM | HEART RATE: 76 BPM

## 2024-04-03 DIAGNOSIS — E78.5 HYPERLIPIDEMIA, UNSPECIFIED HYPERLIPIDEMIA TYPE: ICD-10-CM

## 2024-04-03 DIAGNOSIS — I25.10 CORONARY ARTERY DISEASE INVOLVING NATIVE CORONARY ARTERY OF NATIVE HEART WITHOUT ANGINA PECTORIS: ICD-10-CM

## 2024-04-03 DIAGNOSIS — R26.89 DECREASED FUNCTIONAL MOBILITY: Primary | ICD-10-CM

## 2024-04-03 DIAGNOSIS — E55.9 VITAMIN D DEFICIENCY: ICD-10-CM

## 2024-04-03 DIAGNOSIS — K76.0 FATTY LIVER DISEASE, NONALCOHOLIC: ICD-10-CM

## 2024-04-03 DIAGNOSIS — R26.89 BALANCE PROBLEM: ICD-10-CM

## 2024-04-03 DIAGNOSIS — D35.2 PITUITARY ADENOMA (MULTI): ICD-10-CM

## 2024-04-03 DIAGNOSIS — Z00.00 MEDICARE ANNUAL WELLNESS VISIT, SUBSEQUENT: Primary | ICD-10-CM

## 2024-04-03 DIAGNOSIS — R53.83 FATIGUE, UNSPECIFIED TYPE: ICD-10-CM

## 2024-04-03 DIAGNOSIS — E03.9 ACQUIRED HYPOTHYROIDISM: ICD-10-CM

## 2024-04-03 DIAGNOSIS — Z12.31 ENCOUNTER FOR SCREENING MAMMOGRAM FOR MALIGNANT NEOPLASM OF BREAST: ICD-10-CM

## 2024-04-03 DIAGNOSIS — M85.80 OSTEOPENIA, UNSPECIFIED LOCATION: ICD-10-CM

## 2024-04-03 LAB
25(OH)D3 SERPL-MCNC: 48 NG/ML (ref 30–100)
ALBUMIN SERPL BCP-MCNC: 4.4 G/DL (ref 3.4–5)
ALP SERPL-CCNC: 79 U/L (ref 33–136)
ALT SERPL W P-5'-P-CCNC: 18 U/L (ref 7–45)
ANION GAP SERPL CALC-SCNC: 15 MMOL/L (ref 10–20)
AST SERPL W P-5'-P-CCNC: 21 U/L (ref 9–39)
BASOPHILS # BLD AUTO: 0.03 X10*3/UL (ref 0–0.1)
BASOPHILS NFR BLD AUTO: 0.7 %
BILIRUB SERPL-MCNC: 0.8 MG/DL (ref 0–1.2)
BUN SERPL-MCNC: 13 MG/DL (ref 6–23)
CALCIUM SERPL-MCNC: 9.8 MG/DL (ref 8.6–10.6)
CHLORIDE SERPL-SCNC: 102 MMOL/L (ref 98–107)
CHOLEST SERPL-MCNC: 179 MG/DL (ref 0–199)
CHOLESTEROL/HDL RATIO: 2.7
CO2 SERPL-SCNC: 30 MMOL/L (ref 21–32)
CREAT SERPL-MCNC: 0.74 MG/DL (ref 0.5–1.05)
EGFRCR SERPLBLD CKD-EPI 2021: 85 ML/MIN/1.73M*2
EOSINOPHIL # BLD AUTO: 0.08 X10*3/UL (ref 0–0.4)
EOSINOPHIL NFR BLD AUTO: 1.8 %
ERYTHROCYTE [DISTWIDTH] IN BLOOD BY AUTOMATED COUNT: 12.7 % (ref 11.5–14.5)
FERRITIN SERPL-MCNC: 55 NG/ML (ref 8–150)
FOLATE SERPL-MCNC: 15 NG/ML
GLUCOSE SERPL-MCNC: 94 MG/DL (ref 74–99)
HCT VFR BLD AUTO: 44.7 % (ref 36–46)
HDLC SERPL-MCNC: 66.9 MG/DL
HGB BLD-MCNC: 14.9 G/DL (ref 12–16)
IMM GRANULOCYTES # BLD AUTO: 0.01 X10*3/UL (ref 0–0.5)
IMM GRANULOCYTES NFR BLD AUTO: 0.2 % (ref 0–0.9)
IRON SATN MFR SERPL: 23 % (ref 25–45)
IRON SERPL-MCNC: 94 UG/DL (ref 35–150)
LDLC SERPL CALC-MCNC: 77 MG/DL
LYMPHOCYTES # BLD AUTO: 0.87 X10*3/UL (ref 0.8–3)
LYMPHOCYTES NFR BLD AUTO: 19.5 %
MCH RBC QN AUTO: 32.2 PG (ref 26–34)
MCHC RBC AUTO-ENTMCNC: 33.3 G/DL (ref 32–36)
MCV RBC AUTO: 97 FL (ref 80–100)
MONOCYTES # BLD AUTO: 0.32 X10*3/UL (ref 0.05–0.8)
MONOCYTES NFR BLD AUTO: 7.2 %
NEUTROPHILS # BLD AUTO: 3.16 X10*3/UL (ref 1.6–5.5)
NEUTROPHILS NFR BLD AUTO: 70.6 %
NON HDL CHOLESTEROL: 112 MG/DL (ref 0–149)
NRBC BLD-RTO: 0 /100 WBCS (ref 0–0)
PLATELET # BLD AUTO: 194 X10*3/UL (ref 150–450)
POTASSIUM SERPL-SCNC: 4.7 MMOL/L (ref 3.5–5.3)
PROT SERPL-MCNC: 6.5 G/DL (ref 6.4–8.2)
RBC # BLD AUTO: 4.63 X10*6/UL (ref 4–5.2)
SODIUM SERPL-SCNC: 142 MMOL/L (ref 136–145)
TIBC SERPL-MCNC: 404 UG/DL (ref 240–445)
TRIGL SERPL-MCNC: 178 MG/DL (ref 0–149)
TSH SERPL-ACNC: 2.38 MIU/L (ref 0.44–3.98)
UIBC SERPL-MCNC: 310 UG/DL (ref 110–370)
VIT B12 SERPL-MCNC: 659 PG/ML (ref 211–911)
VLDL: 36 MG/DL (ref 0–40)
WBC # BLD AUTO: 4.5 X10*3/UL (ref 4.4–11.3)

## 2024-04-03 PROCEDURE — 1158F ADVNC CARE PLAN TLK DOCD: CPT | Performed by: INTERNAL MEDICINE

## 2024-04-03 PROCEDURE — 82607 VITAMIN B-12: CPT

## 2024-04-03 PROCEDURE — 84443 ASSAY THYROID STIM HORMONE: CPT

## 2024-04-03 PROCEDURE — 1160F RVW MEDS BY RX/DR IN RCRD: CPT | Performed by: INTERNAL MEDICINE

## 2024-04-03 PROCEDURE — 82728 ASSAY OF FERRITIN: CPT | Mod: WAIVER OF LIABILITY ON FILE

## 2024-04-03 PROCEDURE — 82306 VITAMIN D 25 HYDROXY: CPT

## 2024-04-03 PROCEDURE — 1170F FXNL STATUS ASSESSED: CPT | Performed by: INTERNAL MEDICINE

## 2024-04-03 PROCEDURE — 80053 COMPREHEN METABOLIC PANEL: CPT

## 2024-04-03 PROCEDURE — 36415 COLL VENOUS BLD VENIPUNCTURE: CPT

## 2024-04-03 PROCEDURE — 1123F ACP DISCUSS/DSCN MKR DOCD: CPT | Performed by: INTERNAL MEDICINE

## 2024-04-03 PROCEDURE — 85025 COMPLETE CBC W/AUTO DIFF WBC: CPT

## 2024-04-03 PROCEDURE — 1159F MED LIST DOCD IN RCRD: CPT | Performed by: INTERNAL MEDICINE

## 2024-04-03 PROCEDURE — 1036F TOBACCO NON-USER: CPT | Performed by: INTERNAL MEDICINE

## 2024-04-03 PROCEDURE — 99214 OFFICE O/P EST MOD 30 MIN: CPT | Performed by: INTERNAL MEDICINE

## 2024-04-03 PROCEDURE — G0439 PPPS, SUBSEQ VISIT: HCPCS | Performed by: INTERNAL MEDICINE

## 2024-04-03 PROCEDURE — 97161 PT EVAL LOW COMPLEX 20 MIN: CPT | Mod: GP

## 2024-04-03 PROCEDURE — 83550 IRON BINDING TEST: CPT | Mod: WAIVER OF LIABILITY ON FILE

## 2024-04-03 PROCEDURE — 82746 ASSAY OF FOLIC ACID SERUM: CPT

## 2024-04-03 PROCEDURE — 83540 ASSAY OF IRON: CPT | Mod: WAIVER OF LIABILITY ON FILE

## 2024-04-03 PROCEDURE — 80061 LIPID PANEL: CPT

## 2024-04-03 RX ORDER — LEVOTHYROXINE SODIUM 125 UG/1
62.5 TABLET ORAL DAILY
Qty: 45 TABLET | Refills: 3 | Status: SHIPPED | OUTPATIENT
Start: 2024-04-03

## 2024-04-03 RX ORDER — ATORVASTATIN CALCIUM 20 MG/1
20 TABLET, FILM COATED ORAL DAILY
Qty: 90 TABLET | Refills: 3 | Status: SHIPPED | OUTPATIENT
Start: 2024-04-03

## 2024-04-03 ASSESSMENT — ENCOUNTER SYMPTOMS
SLEEP DISTURBANCE: 0
HALLUCINATIONS: 0
CHILLS: 0
HEMATURIA: 0
ANAL BLEEDING: 0
DIFFICULTY URINATING: 0
WOUND: 0
WHEEZING: 0
STRIDOR: 0
BRUISES/BLEEDS EASILY: 0
NUMBNESS: 0
BLOOD IN STOOL: 0
COUGH: 0
UNEXPECTED WEIGHT CHANGE: 0
DYSURIA: 0
RHINORRHEA: 0
POLYDIPSIA: 0
CHOKING: 0
APPETITE CHANGE: 0
APNEA: 0
HYPERACTIVE: 0
EYE ITCHING: 0
LIGHT-HEADEDNESS: 0
SEIZURES: 0
CONSTIPATION: 0
FACIAL SWELLING: 0
VOICE CHANGE: 0
NECK STIFFNESS: 0
DECREASED CONCENTRATION: 0
ABDOMINAL PAIN: 0
DIARRHEA: 0
EYE PAIN: 0
COLOR CHANGE: 0
ABDOMINAL DISTENTION: 0
BACK PAIN: 0
CONFUSION: 0
HEADACHES: 0
FACIAL ASYMMETRY: 0
TREMORS: 0
NAUSEA: 0
FREQUENCY: 0
SINUS PRESSURE: 0
DIZZINESS: 0
SHORTNESS OF BREATH: 0
FLANK PAIN: 0
AGITATION: 0
EYE REDNESS: 0
JOINT SWELLING: 0
EYE DISCHARGE: 0
FATIGUE: 0
SORE THROAT: 0
PALPITATIONS: 0
DYSPHORIC MOOD: 0
ADENOPATHY: 0
VOMITING: 0
FEVER: 0
NECK PAIN: 0
WEAKNESS: 0
POLYPHAGIA: 0
ARTHRALGIAS: 0
CHEST TIGHTNESS: 0
PHOTOPHOBIA: 0
MYALGIAS: 0
SINUS PAIN: 0
DIAPHORESIS: 0
TROUBLE SWALLOWING: 0
NERVOUS/ANXIOUS: 0
SPEECH DIFFICULTY: 0
ACTIVITY CHANGE: 0
RECTAL PAIN: 0

## 2024-04-03 ASSESSMENT — PATIENT HEALTH QUESTIONNAIRE - PHQ9
2. FEELING DOWN, DEPRESSED OR HOPELESS: NOT AT ALL
2. FEELING DOWN, DEPRESSED OR HOPELESS: NOT AT ALL
1. LITTLE INTEREST OR PLEASURE IN DOING THINGS: NOT AT ALL
SUM OF ALL RESPONSES TO PHQ9 QUESTIONS 1 AND 2: 0
1. LITTLE INTEREST OR PLEASURE IN DOING THINGS: NOT AT ALL
SUM OF ALL RESPONSES TO PHQ9 QUESTIONS 1 AND 2: 0

## 2024-04-03 ASSESSMENT — ACTIVITIES OF DAILY LIVING (ADL)
BATHING: INDEPENDENT
DOING_HOUSEWORK: INDEPENDENT
DRESSING: INDEPENDENT
TAKING_MEDICATION: INDEPENDENT
GROCERY_SHOPPING: INDEPENDENT
MANAGING_FINANCES: INDEPENDENT

## 2024-04-03 ASSESSMENT — COLUMBIA-SUICIDE SEVERITY RATING SCALE - C-SSRS
6. HAVE YOU EVER DONE ANYTHING, STARTED TO DO ANYTHING, OR PREPARED TO DO ANYTHING TO END YOUR LIFE?: NO
1. IN THE PAST MONTH, HAVE YOU WISHED YOU WERE DEAD OR WISHED YOU COULD GO TO SLEEP AND NOT WAKE UP?: NO
2. HAVE YOU ACTUALLY HAD ANY THOUGHTS OF KILLING YOURSELF?: NO

## 2024-04-03 NOTE — PATIENT INSTRUCTIONS
We did blood work and will call with any abnormal results  Call and schedule MRI of brain to make sure no reason you are still having balance issues and sensation of intoxication when not  Call and schedule mammogram due in mid May  Continue all medications as directed-we did refills today  Continue healthy diet and exercise regularly  See about dates of shingles vaccine (2 part) and pneumonia shots   Follow up here in 1 year-sooner if needed

## 2024-04-03 NOTE — ASSESSMENT & PLAN NOTE
History of an adenoma  Will have her repeat MRI due to no recent follow up as well as balance issues and sensation of fatigue/intoxication late in day

## 2024-04-03 NOTE — PROGRESS NOTES
Physical Therapy    Physical Therapy Evaluation and Treatment      Patient Name: Umu Haque  MRN: [id number]  Today's Date: 4/3/2024  Time Calculation  Start Time: 1132  Stop Time: 1217  Time Calculation (min): 45 min    Insurance - reviewed   Visit number: 1  (updated 04/03/24)   Payor: MEDICARE / Plan: MEDICARE PART A AND B / Product Type: *No Product type* /    $0 Applied   Certification Period Start Date: 04/03/24  Certification Period End Date: 07/02/24  Number of Treatments Authorized: 6  Referring Provider: Liyah Fraser DO     Assessment:  PT Assessment  PT Assessment Results: Decreased mobility  Rehab Prognosis: Good   Umu Haque is a 73 y.o. old female who presents to PT vestibular evaluation due to dizziness which she describes as  imbalance/lightheadedness associated with positional changes when turning head with walking and looking down. Umu 's impairments include intermittent dizziness/lightheadedness.  Due to these impairments Umu has the following functional limitations: difficulty with turning when ambulating and navigating her home. Tests for positional vertigo were negative. No other abnormalities noted with oculomotor assessment today. Umu Haque demonstrated inability to read within 2 lines during DVA compared to static visual acuity test. Further testing including repeating positional testing are required to rule a possible BPPV in/out. Umu Haque's presentation consistent with likely BPPV but unable to elicit with multiple trials. Will continue to assess in subsequent visits. If positional testing remains negative, will consider possible hypofunction vs. possible CGD. Umu Haque will benefit from continued skilled vestibular physical therapy to achieve improvements in the patient's functional status to decrease fall risk and to maximize her functional lND and safety at home in order to return to [facility]. Umu  verbalized understanding and is in  agreement with all goals and plan of care. Umu's clinical presentation is Stable and/or uncomplicated characteristics with the level of complexity being low Umu's potential for rehab is good.  Umu verbalized understanding and is in agreement with all goals and plan of care.  Umu left session with all questions answered and no increase in symptoms.       Plan:  OP PT Plan  Treatment/Interventions: Canalith repositioning, Cryotherapy, Dry needling, Education/ Instruction, Electrical stimulation, Gait training, Hot pack, Manual therapy, Neuromuscular re-education, Self care/ home management, Taping techniques, Therapeutic activities, Therapeutic exercises  PT Plan: Skilled PT  PT Frequency: 1 time per week  Duration: 4-6 weeks  Certification Period Start Date: 04/03/24  Certification Period End Date: 07/02/24  Number of Treatments Authorized: 6  Rehab Potential: Good  Plan of Care Agreement: Patient  Continue to screen for BPPV in the afternoon/evening as patient reported symptoms increase a day progresses. Monitor signs and symptoms of dizziness/lightheadedness.     Britni Woods, SPT, participated during session. Oma Hernández, PT, DPT, NCS was present and directly supervised during PT evaluation. I, Oma Hernández, agree with assessment and plan.     Current Problem:   Problem List Items Addressed This Visit             ICD-10-CM    Balance problem R26.89    Relevant Orders    Follow Up In Physical Therapy     Other Visit Diagnoses         Codes    Decreased functional mobility    -  Primary R26.89            Subjective    General:  Umu Haque  is a 73 y.o. female  presenting to the clinic with chief complaint of feelings of being off balance/lightheadedness that began in February 2024. Umu Haque reports she had major dental surgery in December 2023. Umu Haque reports being placed on Cipro in January 2024 following the dental procedure. Umu Haque reports having difficulty  with turning while ambulating (turning R more difficult than L). Umu Haque reports feeling off balance/lightheaded that only lasts a moment that occurs with putting head down and turning. Umu Haque reports off balance feeling increases later in the day. Umu Haque reports no vertigo or room spinning. Umu Haque reports adenoma on pituitary gland was monitored by yearly MRI that stopped in 2021. She reports she recently scheduled an MRI to monitor any changes with the adenoma. Umu Haque reports no falls in the past year.     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     Prior Level of Function: IND    Functional Limitations: navigating home, functional mobility     Pt goals for therapy: Improve balance and decrease dizziness    Precautions:  Fall Risk: None   Denies: Pacemaker, Diabetes, Hypertension, latex allergy, epilepsy/seizures, other known cardiac problems, other known pulmonary problems, diplopia, and drop attacks  Past Medical history: other known neurologic problem- hx of adenoma on pituitary gland (monitored by PCP)    Pain:  0/10    Objective     Oculomotor Exam:   Convergence = WNL  Extraocular ROM = WNL  Smooth pursuits = WNL  Horizontal saccades = negative  Vertical saccades = negative  Cover/uncover = negative  Cross Cover = negative  Spontaneous Nystagmus with goggles = negative  Gaze Evoked Nystagmus with goggles = negative  Horizontal VOR = WNL  Vertical VOR = WNL       Positional Testing: with goggles   [place]-Hallpike Right = negative  [place]-Hallpike Left = negative  Horizontal Roll Test Right =  negative  Horizontal Roll Test Left = negative    Modified Clinical Test of Sensory Interaction in Balance  EO on land = 30 seconds  EC on land =  30 seconds  EO on airex =  30 seconds  EC on airex = 30 seconds     DVA: unable to read within 2 lines compared to static visual acuity     Gait: IND    Transfers: IND    Outcome Measures:  Other Measures  Dizziness Handicap Inventory: 10     EDUCATION:  Outpatient Education  Individual(s) Educated: Patient  Education Provided: Anatomy, Fall Risk, POC  Patient/Caregiver Demonstrated Understanding: yes  Plan of Care Discussed and Agreed Upon: yes  Patient Response to Education: Patient/Caregiver Verbalized Understanding of Information  -Role of PT and PT POC.  -Educated Umu regarding benefit and purpose of skilled vestibular PT services along with results of examination findings and how this correlates to their chief complaint.   -Answered Umu BELCHER Jerseyrosa's questions in full.  -Reviewed relevant anatomy using model  -Reviewed pathophysiology of BPPV using model, rationale for CRP.  -Discussed other possible causes of Dizziness.   -Risk factors associated with INC prevalence of BPPV, possibly that it can return.    Goals:  STGs:    Umu Haque will report < 3 episodes of imbalance/dizziness in one week to decrease risk for falls and improve functional mobility.    Umu Haque will demonstrate independence, excellent understanding of and report compliance with at least 3 exercises in her HEP to facilitate the learning process to maximize PT benefits and to facilitate independent rehab program upon discharge.    LTGs:    Umu Haque will test negative for positional vertigo to improve functional mobility and decrease risk for falls.    Umu Haque will ambulate IND on even surfaces for community distances without imbalance or any major deviations to safety return to [facility] and enhance access to the community.    Umu Haque will  report no complaint of positional imbalance for one week with daily activities.    Umu Haque will demonstrate independence,  excellent understanding of and report compliance with HEP to facilitate the learning process to maximize PT benefits and to facilitate independent rehab program upon discharge.    Time Calculation  Start Time: 1132  Stop Time: 1217  Time Calculation (min): 45 min  PT Evaluation Time Entry  PT Evaluation (Low) Time Entry: 45

## 2024-04-03 NOTE — PROGRESS NOTES
"Subjective   Reason for Visit: Umu Haque is an 73 y.o. female here for a Medicare Wellness visit.     Past Medical, Surgical, and Family History reviewed and updated in chart.    Reviewed all medications by prescribing practitioner or clinical pharmacist (such as prescriptions, OTCs, herbal therapies and supplements) and documented in the medical record.    [facility]  Pt here for MWV.  She is planning to go to [facility] in 10/2024 for her 74th birthday/anniversary.      She had colonoscopy in 2021.  She only had diverticulosis.  Repeat noted for 10 years. No bowel issues.  No heartburn.      She had mammogram in May of 23 and it was normal.      Last pap was in 2017 and was ok.  She continues to see Dr. Rain every year for exam.  She has no pelvic issues.  She has minor incontinence.      She had dexa in 10/23 and showed osteopenia.  She takes calcium and vitamin D.  She denies falls.  She exercises regularly.      She is going to vestibular PT and has one today.  She continues to have some minor balance issues-\"falling over\".  She also has a sensation later in the day/evening where she feels almost intoxicated and very fatigue.  She will go to bed and wake up feeling ok.  The early part of the day she feels better-as the day goes on she notes these symptoms.      She will see new ENT on 4/18.  She had audiometry testing with only showed some high pitch frequencies on the left ear.      She goes to [facility] roughly twice a year normally.  She has recently used chemotherapy lotion to nose-skin lesion.  She has follow up on this particular issue soon.        Patient Care Team:  Liyah QUEZADA DO as PCP - General  Liyah QUEZADA DO [facility] PCP [facility]     Review of Systems   Constitutional:  Negative for activity change, appetite change, chills, diaphoresis, fatigue, fever and unexpected weight change.   HENT:  Negative for congestion, dental problem, drooling, ear discharge, ear pain, facial " "swelling, hearing loss, mouth sores, nosebleeds, postnasal drip, rhinorrhea, sinus pressure, sinus pain, sneezing, sore throat, tinnitus, trouble swallowing and voice change.    Eyes:  Negative for photophobia, pain, discharge, redness, itching and visual disturbance.   Respiratory:  Negative for apnea, cough, choking, chest tightness, shortness of breath, wheezing and stridor.    Cardiovascular:  Negative for chest pain, palpitations and leg swelling.   Gastrointestinal:  Negative for abdominal distention, abdominal pain, anal bleeding, blood in stool, constipation, diarrhea, nausea, rectal pain and vomiting.   Endocrine: Negative for cold intolerance, heat intolerance, polydipsia, polyphagia and polyuria.   Genitourinary:  Negative for decreased urine volume, difficulty urinating, dyspareunia, dysuria, enuresis, flank pain, frequency, genital sores, hematuria, menstrual problem, pelvic pain, urgency, vaginal bleeding, vaginal discharge and vaginal pain.   Musculoskeletal:  Negative for arthralgias, back pain, gait problem, joint swelling, myalgias, neck pain and neck stiffness.   Skin:  Negative for color change, pallor, rash and wound.   Allergic/Immunologic: Negative for environmental allergies, food allergies and immunocompromised state.   Neurological:  Negative for dizziness, tremors, seizures, syncope, facial asymmetry, speech difficulty, weakness, light-headedness, numbness and headaches.   Hematological:  Negative for adenopathy. Does not bruise/bleed easily.   Psychiatric/Behavioral:  Negative for agitation, behavioral problems, confusion, decreased concentration, dysphoric mood, hallucinations, self-injury, sleep disturbance and suicidal ideas. The patient is not nervous/anxious and is not hyperactive.        Objective   Vitals:  /86 (BP Location: Left arm, Patient Position: Sitting)   Pulse 76   Resp 16   Ht 1.6 m (5' 3\")   Wt 64.3 kg (141 lb 12.8 oz)   BMI 25.12 kg/m²       Physical " Exam  Constitutional:       Appearance: Normal appearance.   HENT:      Head: Normocephalic and atraumatic.      Right Ear: Tympanic membrane, ear canal and external ear normal. There is no impacted cerumen.      Left Ear: Tympanic membrane, ear canal and external ear normal. There is no impacted cerumen.      Nose: Nose normal. No congestion or rhinorrhea.      Mouth/Throat:      Mouth: Mucous membranes are moist.      Pharynx: Oropharynx is clear. No oropharyngeal exudate or posterior oropharyngeal erythema.   Eyes:      Extraocular Movements: Extraocular movements intact.      Conjunctiva/sclera: Conjunctivae normal.      Pupils: Pupils are equal, round, and reactive to light.   Neck:      Vascular: No carotid bruit.   Cardiovascular:      Rate and Rhythm: Normal rate and regular rhythm.      Pulses: Normal pulses.      Heart sounds: Normal heart sounds. No murmur heard.  Pulmonary:      Effort: Pulmonary effort is normal. No respiratory distress.      Breath sounds: Normal breath sounds. No wheezing, rhonchi or rales.   Abdominal:      General: Abdomen is flat. Bowel sounds are normal. There is no distension.      Palpations: Abdomen is soft.      Tenderness: There is no abdominal tenderness.      Hernia: No hernia is present.   Musculoskeletal:         General: No swelling or tenderness. Normal range of motion.      Cervical back: Normal range of motion and neck supple.      Right lower leg: No edema.      Left lower leg: No edema.   Lymphadenopathy:      Cervical: No cervical adenopathy.   Skin:     General: Skin is warm and dry.      Findings: No lesion or rash.   Neurological:      General: No focal deficit present.      Mental Status: She is alert and oriented to person, place, and time.      Cranial Nerves: No cranial nerve deficit.      Sensory: No sensory deficit.      Motor: No weakness.      Coordination: Coordination normal.      Gait: Gait normal.      Deep Tendon Reflexes: Reflexes normal.    Psychiatric:         Mood and Affect: Mood normal.         Behavior: Behavior normal.         Thought Content: Thought content normal.         Judgment: Judgment normal.         Assessment/Plan   Problem List Items Addressed This Visit       Coronary artery disease    Current Assessment & Plan     Sees cardiology still  On statin therapy  Repeat lipids          Relevant Orders    Lipid Panel    Fatty liver disease, nonalcoholic    Hyperlipidemia    Current Assessment & Plan     On statin therapy  Repeat fasting labs today          Relevant Medications    atorvastatin (Lipitor) 20 mg tablet    Hypothyroidism    Current Assessment & Plan     On Levothyroxine  Retest TSH          Relevant Medications    levothyroxine (Synthroid, Levoxyl) 125 mcg tablet    Other Relevant Orders    TSH with reflex to Free T4 if abnormal    Osteopenia    Pituitary adenoma (CMS/HCC)    Current Assessment & Plan     History of an adenoma  Will have her repeat MRI due to no recent follow up as well as balance issues and sensation of fatigue/intoxication late in day         Relevant Orders    MR brain wo IV contrast    Vitamin D deficiency    Relevant Orders    Vitamin D 25-Hydroxy,Total (for eval of Vitamin D levels)    Balance problem    Relevant Orders    Iron and TIBC    Ferritin    Vitamin B12    Folate    MR brain wo IV contrast     Other Visit Diagnoses       Medicare annual wellness visit, subsequent    -  Primary    Relevant Orders    Follow Up In Primary Care - Medicare Annual    Encounter for screening mammogram for malignant neoplasm of breast        Relevant Orders    BI mammo bilateral screening tomosynthesis    Fatigue, unspecified type        Relevant Orders    Comprehensive Metabolic Panel    CBC and Auto Differential    Iron and TIBC    Ferritin    MR brain wo IV contrast

## 2024-04-05 DIAGNOSIS — R42 EPISODIC LIGHTHEADEDNESS: Primary | ICD-10-CM

## 2024-04-05 DIAGNOSIS — R26.9 GAIT DISTURBANCE: ICD-10-CM

## 2024-04-05 DIAGNOSIS — D35.2 PITUITARY ADENOMA (MULTI): ICD-10-CM

## 2024-04-08 ENCOUNTER — HOSPITAL ENCOUNTER (OUTPATIENT)
Dept: RADIOLOGY | Facility: CLINIC | Age: 74
Discharge: HOME | End: 2024-04-08
Payer: MEDICARE

## 2024-04-08 DIAGNOSIS — R26.89 BALANCE PROBLEM: ICD-10-CM

## 2024-04-08 DIAGNOSIS — R53.83 FATIGUE, UNSPECIFIED TYPE: ICD-10-CM

## 2024-04-08 DIAGNOSIS — D35.2 PITUITARY ADENOMA (MULTI): ICD-10-CM

## 2024-04-08 PROCEDURE — 70551 MRI BRAIN STEM W/O DYE: CPT

## 2024-04-08 PROCEDURE — 70551 MRI BRAIN STEM W/O DYE: CPT | Performed by: RADIOLOGY

## 2024-04-16 ENCOUNTER — APPOINTMENT (OUTPATIENT)
Dept: AUDIOLOGY | Facility: CLINIC | Age: 74
End: 2024-04-16
Payer: MEDICARE

## 2024-04-18 ENCOUNTER — APPOINTMENT (OUTPATIENT)
Dept: OTOLARYNGOLOGY | Facility: CLINIC | Age: 74
End: 2024-04-18
Payer: MEDICARE

## 2024-04-18 ENCOUNTER — OFFICE VISIT (OUTPATIENT)
Dept: OTOLARYNGOLOGY | Facility: CLINIC | Age: 74
End: 2024-04-18
Payer: MEDICARE

## 2024-04-18 VITALS
WEIGHT: 141 LBS | SYSTOLIC BLOOD PRESSURE: 145 MMHG | DIASTOLIC BLOOD PRESSURE: 84 MMHG | TEMPERATURE: 97.5 F | BODY MASS INDEX: 24.98 KG/M2 | HEART RATE: 75 BPM | HEIGHT: 63 IN

## 2024-04-18 DIAGNOSIS — R26.89 IMBALANCE: ICD-10-CM

## 2024-04-18 DIAGNOSIS — H91.92 HIGH-FREQUENCY HEARING LOSS OF LEFT EAR: ICD-10-CM

## 2024-04-18 DIAGNOSIS — L29.9 EAR ITCHING: Primary | ICD-10-CM

## 2024-04-18 PROCEDURE — 1160F RVW MEDS BY RX/DR IN RCRD: CPT | Performed by: NURSE PRACTITIONER

## 2024-04-18 PROCEDURE — 1036F TOBACCO NON-USER: CPT | Performed by: NURSE PRACTITIONER

## 2024-04-18 PROCEDURE — 1123F ACP DISCUSS/DSCN MKR DOCD: CPT | Performed by: NURSE PRACTITIONER

## 2024-04-18 PROCEDURE — 99214 OFFICE O/P EST MOD 30 MIN: CPT | Performed by: NURSE PRACTITIONER

## 2024-04-18 PROCEDURE — 99204 OFFICE O/P NEW MOD 45 MIN: CPT | Performed by: NURSE PRACTITIONER

## 2024-04-18 PROCEDURE — 1159F MED LIST DOCD IN RCRD: CPT | Performed by: NURSE PRACTITIONER

## 2024-04-18 PROCEDURE — 1126F AMNT PAIN NOTED NONE PRSNT: CPT | Performed by: NURSE PRACTITIONER

## 2024-04-18 RX ORDER — FLUOCINOLONE ACETONIDE 0.11 MG/ML
OIL AURICULAR (OTIC)
Qty: 20 ML | Refills: 0 | Status: SHIPPED | OUTPATIENT
Start: 2024-04-18

## 2024-04-18 SDOH — ECONOMIC STABILITY: FOOD INSECURITY: WITHIN THE PAST 12 MONTHS, YOU WORRIED THAT YOUR FOOD WOULD RUN OUT BEFORE YOU GOT MONEY TO BUY MORE.: NEVER TRUE

## 2024-04-18 SDOH — ECONOMIC STABILITY: FOOD INSECURITY: WITHIN THE PAST 12 MONTHS, THE FOOD YOU BOUGHT JUST DIDN'T LAST AND YOU DIDN'T HAVE MONEY TO GET MORE.: NEVER TRUE

## 2024-04-18 ASSESSMENT — PAIN SCALES - GENERAL: PAINLEVEL: 0-NO PAIN

## 2024-04-18 ASSESSMENT — LIFESTYLE VARIABLES
AUDIT-C TOTAL SCORE: 3
HOW MANY STANDARD DRINKS CONTAINING ALCOHOL DO YOU HAVE ON A TYPICAL DAY: 1 OR 2
HOW OFTEN DO YOU HAVE SIX OR MORE DRINKS ON ONE OCCASION: NEVER
HOW OFTEN DO YOU HAVE A DRINK CONTAINING ALCOHOL: 2-3 TIMES A WEEK
SKIP TO QUESTIONS 9-10: 1

## 2024-04-18 ASSESSMENT — ENCOUNTER SYMPTOMS
OCCASIONAL FEELINGS OF UNSTEADINESS: 0
DEPRESSION: 0
LOSS OF SENSATION IN FEET: 0

## 2024-04-18 ASSESSMENT — COLUMBIA-SUICIDE SEVERITY RATING SCALE - C-SSRS
1. IN THE PAST MONTH, HAVE YOU WISHED YOU WERE DEAD OR WISHED YOU COULD GO TO SLEEP AND NOT WAKE UP?: NO
6. HAVE YOU EVER DONE ANYTHING, STARTED TO DO ANYTHING, OR PREPARED TO DO ANYTHING TO END YOUR LIFE?: NO
2. HAVE YOU ACTUALLY HAD ANY THOUGHTS OF KILLING YOURSELF?: NO

## 2024-04-18 NOTE — PROGRESS NOTES
Subjective   Patient ID: Umu Haque is a 73 y.o. female who presents for balance issues.    HPI  Issue with her right ear- itchy for years. Sees dermatology.     Betty, minor imbalance that she has had for months. Saw PCP in March. In December she had dental work done, bone graft. Dentist put her on an antibiotic and a steroid- then was put on Cipro. She started having imbalance after that. Was walking and would bump into things. Walking straight isn't an issue. When she has to turn to move in her house she is bumping into things, more on the right side. The balance is gradually improving. Denies vertigo, n/v. Slight sensation in her head, like a lightness. Went to vestibular PT- didn't see that. After PT, she was feeling motion sickness the rest of the night.   No cold or flu prior to this happening.   Bright lights and loud sounds don't make her feel dizzy.   No pressure induced dizziness. No autophony.   Turning does seem to be trigger the sensation. No spinning.   No headaches.   Pituitary adenoma in 2017- gets MRIs. Saw Dr. Roberts previously who said she no longer needed annual imaging unless she became symptomatic. Seeing neurology in July.     Patient Active Problem List   Diagnosis    Arthritis of carpometacarpal (CMC) joint of right thumb    Coronary artery disease    Dense breast tissue    Effusion of left knee    Episodic lightheadedness    Fatty liver disease, nonalcoholic    Breast mass    Fibrocystic disease of breast    Gallbladder polyp    History of basal cell carcinoma (BCC)    Hyperlipidemia    Hypothyroidism    Insomnia    Osteopenia    Pituitary adenoma (Multi)    Vitamin D deficiency    Balance problem     Past Surgical History:   Procedure Laterality Date    APPENDECTOMY  09/24/2013    Appendectomy    BREAST BIOPSY  11/21/2013    Biopsy Breast Open    BREAST LUMPECTOMY  09/24/2013    Left Breast Lumpectomy    CATARACT EXTRACTION, BILATERAL Bilateral     MR HEAD ANGIO WO IV CONTRAST   06/15/2023    MR HEAD ANGIO WO IV CONTR[place] [facility] [place] [id number] [place]    OTHER SURGICAL HISTORY  09/24/2013    Oophorectomy Unilateral Left Side    OTHER SURGICAL HISTORY  04/01/2019    Excision of basal cell carcinoma    TONSILLECTOMY  09/24/2013    Tonsillectomy     Review of Systems    Objective   Physical Exam    Constitutional: No fever, chills, weight loss or weight gain  General appearance: Appears well, well-nourished, well groomed. No acute distress.    Communication: Normal communication    Psychiatric: Oriented to person, place and time. Normal mood and affect.    Neurologic: Cranial nerves II-XII grossly intact and symmetric bilaterally.    Head and Face:  Head: Atraumatic with no masses, lesions or scarring.  Face: Normal symmetry. No scars or deformities.  TMJ: Normal, no trismus.    Eyes: Conjunctiva not edematous or erythematous. PERRLA    Right Ear: External inspection of ear with no deformity, scars, or masses. EAC is clear.  TM is intact with no sign of infection, effusion, or retraction.  No perforation seen.     Left Ear: External inspection of ear with no deformity, scars, or masses. EAC is clear.  TM is intact with no sign of infection, effusion, or retraction.  No perforation seen.     Nose: External inspection of nose: No nasal lesions, lacerations or scars. Anterior rhinoscopy with limited visualization past the inferior turbinates. No tenderness on frontal or maxillary sinus palpation.    Oral Cavity/Mouth: Oral cavity and oropharynx mucosa moist and pink. No lesions or masses. Dentition normal. Tonsils appear surgically removed. Uvula is midline. Tongue with no masses or lesions. Tongue with good mobility. The oropharynx is clear.    Neck: Normal appearing, symmetric, trachea midline.     Cardiovascular: Examination of peripheral vascular system shows no clubbing or cyanosis.    Respiratory: No respiratory distress increased work of breathing. Inspection of the chest with symmetric chest  expansion and normal respiratory effort.    Skin: No head and neck rashes.    Lymph nodes: No adenopathy.     On vestibular exam, oculomotor function assessment shows normal ocular pursuits and saccades. There is no spontaneous nystagmus. Head Thrust test is negative bilaterally.  Postural testing performed. Romberg is negative for any obvious weakness/sway. Tandem Gait is negative for any obvious weakness/sway.     Reviewed PT clinic note.   Reviewed PCP clinic notes.   Reviewed MRI brain report.     Diagnostic Results       Assessment/Plan   Diagnoses and all orders for this visit:  Ear itching  Use Fluocinolone ear drops as needed for ear itching. Do not use daily as this can lead to skin breakdown.   High-frequency hearing loss of left ear  Imbalance  The physiology of balance control was explained. The likely possible etiologies were reviewed and the patient was thoroughly evaluated for BPPV, vestibular neuritis/labyrinthitis, vestibular hypofunction, Menieres Disease, and SCCD. I believe the patient does may have a peripheral vestibular disorder, but this seems to be gradually improving. Possible vestibular neuritis. Patient would like to cancel PT appt at this time because she felt more dizzy afterwards. Since improving, we will monitor at this time and she will update me if symptoms worsen. She will follow up with neurology in July.  All questions answered to patient satisfaction.    30 minutes was spent on this patient´s visit. More than 50% of that time was spent in counseling regarding the possible etiologies, test results, treatment options and coordinating care.              CHIP Ramirez-CNP 04/18/24 9:28 AM

## 2024-04-22 ENCOUNTER — APPOINTMENT (OUTPATIENT)
Dept: PHYSICAL THERAPY | Facility: CLINIC | Age: 74
End: 2024-04-22
Payer: MEDICARE

## 2024-04-22 ENCOUNTER — TELEPHONE (OUTPATIENT)
Dept: PRIMARY CARE | Facility: CLINIC | Age: 74
End: 2024-04-22
Payer: MEDICARE

## 2024-04-22 DIAGNOSIS — G47.00 INSOMNIA, UNSPECIFIED TYPE: Primary | ICD-10-CM

## 2024-04-22 NOTE — TELEPHONE ENCOUNTER
Umu needs  a referral to neurologist for sleep issues,  wants to see Dr. Lam but needs in system before will schedule. Insomnia, midnight awakenings, she said you are aware of this.

## 2024-04-26 ENCOUNTER — APPOINTMENT (OUTPATIENT)
Dept: AUDIOLOGY | Facility: CLINIC | Age: 74
End: 2024-04-26
Payer: MEDICARE

## 2024-04-26 ENCOUNTER — APPOINTMENT (OUTPATIENT)
Dept: OTOLARYNGOLOGY | Facility: CLINIC | Age: 74
End: 2024-04-26
Payer: MEDICARE

## 2024-06-24 ENCOUNTER — TELEPHONE (OUTPATIENT)
Dept: OBSTETRICS AND GYNECOLOGY | Facility: CLINIC | Age: 74
End: 2024-06-24
Payer: MEDICARE

## 2024-06-24 DIAGNOSIS — N89.8 VAGINAL ITCHING: Primary | ICD-10-CM

## 2024-06-24 RX ORDER — FLUCONAZOLE 150 MG/1
150 TABLET ORAL ONCE
Qty: 2 TABLET | Refills: 1 | Status: SHIPPED | OUTPATIENT
Start: 2024-06-24 | End: 2024-06-24

## 2024-06-24 NOTE — TELEPHONE ENCOUNTER
Pt called requesting medication for yeast infection. Pt has had an oral infection ad has been put on 2 antibiotics over the last 7 days and she has 3 more days to go until antibiotics are completed. I advised to make sure to complete her antibiotics and a message to Dr Rain will be sent regarding her yeast infection. Pt feeling itchy and irritated in vaginal area no discharge.   Pharmacy in chart correct    Pt taking the following antibiotics:    Ciprofloxacin  metronidazole        Britni Navarro

## 2024-07-11 ENCOUNTER — APPOINTMENT (OUTPATIENT)
Dept: NEUROLOGY | Facility: CLINIC | Age: 74
End: 2024-07-11
Payer: MEDICARE

## 2024-07-11 VITALS
HEART RATE: 77 BPM | WEIGHT: 138.8 LBS | HEIGHT: 64 IN | RESPIRATION RATE: 16 BRPM | DIASTOLIC BLOOD PRESSURE: 80 MMHG | BODY MASS INDEX: 23.7 KG/M2 | SYSTOLIC BLOOD PRESSURE: 140 MMHG | TEMPERATURE: 98.4 F

## 2024-07-11 DIAGNOSIS — R26.9 GAIT DISTURBANCE: ICD-10-CM

## 2024-07-11 DIAGNOSIS — G43.109 OCULAR MIGRAINE: ICD-10-CM

## 2024-07-11 DIAGNOSIS — G25.81 RESTLESS LEGS: ICD-10-CM

## 2024-07-11 DIAGNOSIS — D50.9 IRON DEFICIENCY ANEMIA, UNSPECIFIED IRON DEFICIENCY ANEMIA TYPE: ICD-10-CM

## 2024-07-11 DIAGNOSIS — R42 EPISODIC LIGHTHEADEDNESS: ICD-10-CM

## 2024-07-11 DIAGNOSIS — G45.9 TRANSIENT CEREBRAL ISCHEMIA, UNSPECIFIED TYPE: ICD-10-CM

## 2024-07-11 DIAGNOSIS — F51.01 PRIMARY INSOMNIA: Primary | ICD-10-CM

## 2024-07-11 DIAGNOSIS — I63.442: ICD-10-CM

## 2024-07-11 DIAGNOSIS — D35.2 PITUITARY ADENOMA (MULTI): ICD-10-CM

## 2024-07-11 DIAGNOSIS — G45.9 TIA (TRANSIENT ISCHEMIC ATTACK): ICD-10-CM

## 2024-07-11 PROCEDURE — 1159F MED LIST DOCD IN RCRD: CPT | Performed by: PSYCHIATRY & NEUROLOGY

## 2024-07-11 PROCEDURE — 1160F RVW MEDS BY RX/DR IN RCRD: CPT | Performed by: PSYCHIATRY & NEUROLOGY

## 2024-07-11 PROCEDURE — 1123F ACP DISCUSS/DSCN MKR DOCD: CPT | Performed by: PSYCHIATRY & NEUROLOGY

## 2024-07-11 PROCEDURE — G2211 COMPLEX E/M VISIT ADD ON: HCPCS | Performed by: PSYCHIATRY & NEUROLOGY

## 2024-07-11 PROCEDURE — 99205 OFFICE O/P NEW HI 60 MIN: CPT | Performed by: PSYCHIATRY & NEUROLOGY

## 2024-07-11 PROCEDURE — 1036F TOBACCO NON-USER: CPT | Performed by: PSYCHIATRY & NEUROLOGY

## 2024-07-11 RX ORDER — FERROUS SULFATE 325(65) MG
325 TABLET ORAL 2 TIMES DAILY
Qty: 30 TABLET | Refills: 2 | Status: SHIPPED | OUTPATIENT
Start: 2024-07-11 | End: 2025-07-11

## 2024-07-11 ASSESSMENT — ENCOUNTER SYMPTOMS
DEPRESSION: 0
LOSS OF SENSATION IN FEET: 0
DIZZINESS: 1
OCCASIONAL FEELINGS OF UNSTEADINESS: 0

## 2024-07-11 NOTE — PATIENT INSTRUCTIONS
The patient is stable from a neurological and sleep medicine standpoint.  I would like the patient to have an MRA of the neck, echocardiogram and cardiac monitor for 2 weeks.  I would like the patient to start aspirin at 81 mg a day and continue her atorvastatin.    We can certainly consider increasing the atorvastatin to 40 mg a day in an effort to get her LDL less than 70.  The patient needs to continue stroke risk factor modification.  I would like to start the patient on iron supplementation twice a day for 3 months.    I will also give her vitamin C 100 mg a day for 3 months.  After the patient is completed 3 months I would like her to have a repeat ferritin level.  The patient needs to exercise on a daily basis.  The patient does need to follow with neurosurgery for his serial MRI scans for her cystic lesions in the sella.  I discussed all these issues in detail with the patient and her  and answered all their questions.  The patient will follow-up with me in 1 year.

## 2024-07-11 NOTE — PROGRESS NOTES
Subjective     Umu Haque 73 y.o.  HPI  The patient and her  both attend the appointment today.  The patient states that she has had insomnia for about 20 years.  The patient feels that all these problems started after menopause.  The patient states that she typically has lights out at 10 PM and it takes her about 20 to 25 minutes to fall asleep.  The patient will frequently wake up in the middle of the night around 2 AM to 3 AM.  The patient can be up for up to several hours a night or it can be just for 10 to 15 minutes.  The patient will wake for the day at 6 AM.  The patient states that prior to menopause she would have lights out at around 10 PM and she would wake for the day at 6 AM.  She does not remember napping before menopause but currently she occasionally naps for about 20 minutes to half an hour.  Her [place] Sleepiness Scale score today was 9.  The patient does not snore and her  that has no apneas while asleep.  The patient states that she does move her legs prior to going to sleep.  The patient states that she tried Ambien and melatonin but does not feel that it helped.  The patient states that she had hypnotherapy years ago and that this actually helped with her insomnia.  She did have CBT-I but she did not like who she worked with and does not feel that it helped.  The patient recently had a ferritin level that was 55.  Her iron level is 94 with an TIBC of 404.  Her percent iron saturation was 23%.    The patient states that she does go to the gym and walks on a daily basis.  The patient states that about 10 years ago she had a diagnostic polysomnogram that was normal.  She has not had a significant weight gain since then.  The patient denies any hypnagogic or hypnopompic hallucinations, sleep paralysis or cataplexy.    The patient recently had an MRI of the brain that showed some scattered small vessel changes and a questionable small old infarction in the left cerebellum.   No acute process was noted.  I did review the images of the MRI of the brain with the patient and her  and they also reviewed the patient's latest MRA of the brain from 2023 that was normal.  The patient also had an MRI of the sella from 2023 that showed unchanged benign cystic lesions in the lateral sella.  The patient recently had a lipid panel that showed a cholesterol was 179 with an LDL of 77.    The patient states that this past March she developed some unsteadiness while walking.  She states that when she would turn into her room she would walk into door frames.  She feels that this has slowly improved over time.  The patient states that she did see ENT and had an ENG that was negative for BPPV.  The patient states that she used to have migraine headaches prior to menopause and after menopause she states that the migraines resolved.  The patient states that on April 19, 2024 she began to have vision changes in left side of her vision and subsequently was seen by ophthalmology who diagnosed her with ocular migraine.  She had another 1 on June 23 after a nap.  She states that these episodes lasted for approximately several hours.    Review of Systems   Neurological:  Positive for dizziness.        Insomnia        Patient Active Problem List   Diagnosis    Arthritis of carpometacarpal (CMC) joint of right thumb    Coronary artery disease    Dense breast tissue    Effusion of left knee    Episodic lightheadedness    Fatty liver disease, nonalcoholic    Breast mass    Fibrocystic disease of breast    Gallbladder polyp    History of basal cell carcinoma (BCC)    Hyperlipidemia    Hypothyroidism    Insomnia    Osteopenia    Pituitary adenoma (Multi)    Vitamin D deficiency    Balance problem        Past Medical History:   Diagnosis Date    Anemia, iron deficiency 02/07/2023    Basal cell carcinoma of skin of unspecified parts of face     Basal cell carcinoma of skin of face    COVID-19 02/07/2023    Gall  bladder stones 02/07/2023    GERD (gastroesophageal reflux disease) 02/07/2023    Impingement syndrome of left shoulder 02/07/2023    Left sciatic nerve pain 02/07/2023    Personal history of other diseases of the circulatory system     History of coronary artery disease    Personal history of other diseases of the circulatory system     History of cardiac disorder    Personal history of other diseases of the musculoskeletal system and connective tissue     History of osteopenia    Personal history of other endocrine, nutritional and metabolic disease     History of hypothyroidism    Personal history of other endocrine, nutritional and metabolic disease     History of hyperlipidemia    Personal history of other endocrine, nutritional and metabolic disease     History of hypothyroidism    Personal history of other infectious and parasitic diseases     History of varicella    Personal history of other infectious and parasitic diseases     History of measles    Personal history of other infectious and parasitic diseases     History of mumps    Personal history of other malignant neoplasm of skin     History of basal cell carcinoma    Personal history of other specified conditions     History of fibrocystic disease of breast    Personal history of other specified conditions     History of insomnia    Personal history of other specified conditions     History of insomnia    Personal history of other specified conditions     History of chest pain    Pituitary adenoma (Multi)     2017    Salivary gland enlargement 02/07/2023        Past Surgical History:   Procedure Laterality Date    APPENDECTOMY  09/24/2013    Appendectomy    BREAST BIOPSY  11/21/2013    Biopsy Breast Open    BREAST LUMPECTOMY  09/24/2013    Left Breast Lumpectomy    CATARACT EXTRACTION, BILATERAL Bilateral     MR HEAD ANGIO WO IV CONTRAST  06/15/2023    MR HEAD ANGIO WO IV CONTR[place] [id number] [place]    OTHER SURGICAL HISTORY  09/24/2013     Oophorectomy Unilateral Left Side    OTHER SURGICAL HISTORY  04/01/2019    Excision of basal cell carcinoma    TONSILLECTOMY  09/24/2013    Tonsillectomy        Social History     Socioeconomic History    Marital status:      Spouse name: Easton Haque    Number of children: 0    Years of education: Not on file    Highest education level: Not on file   Occupational History    Occupation: retired   Tobacco Use    Smoking status: Never     Passive exposure: Never    Smokeless tobacco: Never   Vaping Use    Vaping status: Never Used   Substance and Sexual Activity    Alcohol use: Not Currently     Comment: occasionally    Drug use: Never    Sexual activity: Yes     Partners: Male   Other Topics Concern    Not on file   Social History Narrative    Not on file     Social Determinants of Health     Financial Resource Strain: Not on file   Food Insecurity: No Food Insecurity (4/18/2024)    Hunger Vital Sign     Worried About Running Out of Food in the Last Year: Never true     Ran Out of Food in the Last Year: Never true   Transportation Needs: Not on file   Physical Activity: Not on file   Stress: Not on file   Social Connections: Not on file   Intimate Partner Violence: Not on file   Housing Stability: Not on file        Family History   Problem Relation Name Age of Onset    Heart attack Mother      Diabetes type II Mother      Heart attack Father      Schizophrenia Father      Transient ischemic attack Father      No Known Problems Sister      Heart attack Brother      Other (cabg) Brother      Schizophrenia Brother      Parkinsonism Father's Brother      Breast cancer Maternal Grandmother          Current Outpatient Medications   Medication Instructions    atorvastatin (LIPITOR) 20 mg, oral, Daily    calcium-[id number]-minerals 500 mg calcium- 400 unit-15 mcg tablet oral    cholecalciferol (VITAMIN D-3) 100 mcg, oral, Daily RT    cyanocobalamin (VITAMIN B-12) 1,000 mcg, oral, Daily    fluocinolone  "(DermOtic Oil) 0.01 % ear drops Administer 4 drops to the ear twice daily for the next 5 days and then as needed for itching    levothyroxine (SYNTHROID, LEVOXYL) 62.5 mcg, oral, Daily    zolpidem (AMBIEN) 5 mg, oral, Nightly PRN        Allergies   Allergen Reactions    Cephalexin GI Upset     C-diff symptoms -but not c-diff    Medrol [Methylprednisolone] Other     Flushing        Objective  /80 (BP Location: Right arm)   Pulse 77   Temp 36.9 °C (98.4 °F)   Resp 16   Ht 1.626 m (5' 4\")   Wt 63 kg (138 lb 12.8 oz)   BMI 23.82 kg/m²    GENERAL APPEARANCE:  No distress, alert and cooperative.      The patient has a Mallampati class III airway.      CARDIOVASCULAR: Regular, rate and rhythm, without murmur. No carotid bruits. Pulses +2 and equal in all extremities. No edema, or tenderness to palpation.     MENTAL STATE:  Orientation was normal to time, place and person. Recent and remote memory was intact.  Attention span and concentration were normal. Language testing was normal for comprehension, repetition, expression, and naming. Calculation was intact. The patient could correctly interpret a picture, and copy a diagram. General fund of knowledge was intact. Mini-mental status examination was performed with no errors.      OPHTHALMOSCOPIC: The ophthalmoscopic exam normal. The fundi were well visualized with normal disc margins, clear vessels and vascular pulsations. No disc edema. The cup/disk ratio was not enlarged. No hemorrhages or exudates were present in the posterior segments that were visualized.      CRANIAL NERVES:  Cranial nerves were normal.      CN 2- Visual Acuity  OD: 20/20 (corrected)   OS: 20/20 (corrected); visual fields full to confrontation.      CN 3, 4, 6-  Pupils round, 4 mm in diameter, equally reactive to light. No ptosis. EOMs normal alignment, full range of movement, no nystagmus     CN 5- Facial sensation intact bilaterally. Normal corneal reflexes.      CN 7- Normal and " symmetric facial strength. Nasolabial folds symmetric.     CN 8- Hearing intact to finger rub, whisper.      CN 9- Palate elevates symmetrically. Normal gag reflex.      CN 11- Normal strength of shoulder shrug and neck turning      CN 12- Tongue midline, with normal bulk and strength; no fasciculations.      MOTOR:  Motor exam was normal. Muscle bulk and tone were normal in both upper and lower extremities. Muscle strength was 5/5 in distal and proximal muscles in both upper and lower extremities. No fasciculations, tremor or other abnormal movements were present.      REFLEXES:  Right/ Left:  Biceps 2/2, brachioradialis 2/2, triceps 2/2, patellar 2/2, ankle 2/2  Babinski: toes downgoing to plantar stimulation. No clonus, frontal release signs or other pathologic reflexes present.      SENSORY: Sensory exam was intact to light touch, sharp/dull, vibration and position sense in both UE and LE.      COORDINATION: JIM were intact in upper and lower extremities.  In UE- finger-nose-finger was intact and in LE- heel-to-shin was intact without dysmetria or overshoot.       GAIT: Station was stable with a normal base and negative Romberg sign. Gait was stable with a normal arm swing and speed. No ataxia, shuffling, steppage or waddling was noted. Tandem gait was intact. Postural reflexes were normal.     Assessment/Plan   Impression: The patient is a 73-year old female who developed sleep maintenance issues after menopause.  The patient also has benign cysts noted in the lateral sella.  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  Her neurological examination is normal.  The differential diagnosis for the patient's insomnia includes psychophysiologic insomnia, periodic limb movement of sleep, restless leg syndrome and depression.  The differential diagnosis for stroke includes intra or extracranial atherosclerotic disease or a cardiac source of embolism.    Plan: The patient is stable from a neurological and  sleep medicine standpoint.  I would like the patient to have an MRA of the neck, echocardiogram and cardiac monitor for 2 weeks.  I would like the patient to start aspirin at 81 mg a day and continue her atorvastatin.    We can certainly consider increasing the atorvastatin to 40 mg a day in an effort to get her LDL less than 70.  The patient needs to continue stroke risk factor modification.  I would like to start the patient on iron supplementation twice a day for 3 months.    I will also give her vitamin C 100 mg a day for 3 months.  After the patient is completed 3 months I would like her to have a repeat ferritin level.  The patient needs to exercise on a daily basis.  The patient does need to follow with neurosurgery for his serial MRI scans for her cystic lesions in the sella.  I discussed all these issues in detail with the patient and her  and answered all their questions.  The patient will follow-up with me in 1 year.

## 2024-07-12 ENCOUNTER — TELEPHONE (OUTPATIENT)
Dept: PRIMARY CARE | Facility: CLINIC | Age: 74
End: 2024-07-12
Payer: MEDICARE

## 2024-07-12 DIAGNOSIS — E78.5 HYPERLIPIDEMIA, UNSPECIFIED HYPERLIPIDEMIA TYPE: ICD-10-CM

## 2024-07-12 RX ORDER — ATORVASTATIN CALCIUM 20 MG/1
30 TABLET, FILM COATED ORAL DAILY
Qty: 135 TABLET | Refills: 1 | Status: SHIPPED | OUTPATIENT
Start: 2024-07-12

## 2024-07-12 NOTE — TELEPHONE ENCOUNTER
Ok to do 1.5 tablets of the 20 mg/dose-I sent in new rx to reflect this change; repeat lipids via blood work in 3 months (order is in)

## 2024-07-12 NOTE — TELEPHONE ENCOUNTER
Patient called and saw new neurologist.  He wants her to bring her LDL down to 70 (now is 79).  He wants her to go on Atorvastatin 40mg instead of 20mg.  She would rather do 30mg by either taking 1-1/2 tablets and or taking a 20 and a 10mg for 3 mos and see if her numbers are better.  If you are ok with this, please send in new Rx with increased dosage.

## 2024-07-16 ENCOUNTER — APPOINTMENT (OUTPATIENT)
Dept: NEUROLOGY | Facility: CLINIC | Age: 74
End: 2024-07-16
Payer: MEDICARE

## 2024-07-20 ENCOUNTER — HOSPITAL ENCOUNTER (OUTPATIENT)
Dept: RADIOLOGY | Facility: HOSPITAL | Age: 74
Discharge: HOME | End: 2024-07-20
Payer: MEDICARE

## 2024-07-20 DIAGNOSIS — I63.442: ICD-10-CM

## 2024-07-20 PROCEDURE — 70547 MR ANGIOGRAPHY NECK W/O DYE: CPT

## 2024-08-13 ENCOUNTER — HOSPITAL ENCOUNTER (OUTPATIENT)
Dept: CARDIOLOGY | Facility: CLINIC | Age: 74
Discharge: HOME | End: 2024-08-13
Payer: MEDICARE

## 2024-08-13 VITALS — BODY MASS INDEX: 23.85 KG/M2 | WEIGHT: 138 LBS

## 2024-08-13 DIAGNOSIS — I63.442: ICD-10-CM

## 2024-08-13 DIAGNOSIS — G45.9 TRANSIENT CEREBRAL ISCHEMIA, UNSPECIFIED TYPE: ICD-10-CM

## 2024-08-13 LAB
AORTIC VALVE MEAN GRADIENT: 3.9 MMHG
AORTIC VALVE PEAK VELOCITY: 1.37 M/S
AV PEAK GRADIENT: 7.5 MMHG
AVA (PEAK VEL): 1.64 CM2
AVA (VTI): 1.78 CM2
EJECTION FRACTION APICAL 4 CHAMBER: 61.1
EJECTION FRACTION: 58 %
LEFT VENTRICLE INTERNAL DIMENSION DIASTOLE: 3.23 CM (ref 3.5–6)
LEFT VENTRICULAR OUTFLOW TRACT DIAMETER: 1.89 CM
MITRAL VALVE E/A RATIO: 0.61
RIGHT VENTRICLE FREE WALL PEAK S': 13 CM/S
RIGHT VENTRICLE PEAK SYSTOLIC PRESSURE: 20.8 MMHG
TRICUSPID ANNULAR PLANE SYSTOLIC EXCURSION: 2.2 CM

## 2024-08-13 PROCEDURE — 93306 TTE W/DOPPLER COMPLETE: CPT

## 2024-08-13 PROCEDURE — 93247 EXT ECG>7D<15D SCAN A/R: CPT

## 2024-08-13 PROCEDURE — 93306 TTE W/DOPPLER COMPLETE: CPT | Performed by: STUDENT IN AN ORGANIZED HEALTH CARE EDUCATION/TRAINING PROGRAM

## 2024-08-21 ENCOUNTER — OFFICE VISIT (OUTPATIENT)
Dept: PRIMARY CARE | Facility: CLINIC | Age: 74
End: 2024-08-21
Payer: MEDICARE

## 2024-08-21 VITALS
WEIGHT: 140.2 LBS | HEART RATE: 74 BPM | BODY MASS INDEX: 24.23 KG/M2 | SYSTOLIC BLOOD PRESSURE: 126 MMHG | RESPIRATION RATE: 18 BRPM | DIASTOLIC BLOOD PRESSURE: 83 MMHG

## 2024-08-21 DIAGNOSIS — R55 NEAR SYNCOPE: Primary | ICD-10-CM

## 2024-08-21 PROCEDURE — 1123F ACP DISCUSS/DSCN MKR DOCD: CPT | Performed by: INTERNAL MEDICINE

## 2024-08-21 PROCEDURE — 99214 OFFICE O/P EST MOD 30 MIN: CPT | Performed by: INTERNAL MEDICINE

## 2024-08-21 PROCEDURE — 1159F MED LIST DOCD IN RCRD: CPT | Performed by: INTERNAL MEDICINE

## 2024-08-21 PROCEDURE — 1036F TOBACCO NON-USER: CPT | Performed by: INTERNAL MEDICINE

## 2024-08-21 PROCEDURE — 1160F RVW MEDS BY RX/DR IN RCRD: CPT | Performed by: INTERNAL MEDICINE

## 2024-08-21 ASSESSMENT — ENCOUNTER SYMPTOMS
COUGH: 0
DIFFICULTY URINATING: 0
SHORTNESS OF BREATH: 0
NUMBNESS: 0
DYSURIA: 0
DIZZINESS: 0
FREQUENCY: 0
TREMORS: 0
WEAKNESS: 0
LIGHT-HEADEDNESS: 1
FACIAL ASYMMETRY: 0
FATIGUE: 0
WHEEZING: 0
HEADACHES: 0
PALPITATIONS: 0
SPEECH DIFFICULTY: 0
CHEST TIGHTNESS: 0
CHILLS: 0
SEIZURES: 0
FEVER: 0

## 2024-08-21 NOTE — ASSESSMENT & PLAN NOTE
Unclear the cause of this event  She is currently wearing a Holter monitor and I explained to patient concern for possible arrhythmia as cause of event  She is feeling better at this time  She has follow up with cardiology next week  She did discuss event with neurology who didn't feel it was neurologically related   Due to her current work up I did provide her with a note to submit to airline for her upcoming trip which she wishes to cancel/postpone at this time-this is reasonable as we await further work up

## 2024-08-21 NOTE — PROGRESS NOTES
Subjective   Patient ID: Umu Haque is a 73 y.o. female who presents for Follow-up ([facility] -- ED follow-up.  Patient almost passed out in a store.  She went to ER ([facility]).  She sent you results thru portal.  Patient has appointment with Cardiology on Thursday.).    HPI    Pt here in ER follow up.  She went to [facility] on 8/19 due to feeling faint while out shopping (1:30 pm).  She was at the store when she felt acutely lightheaded as if she may pass out.  She was given a chair by a store employee and did sit down.  She called her  and by the time he came and they went to ER she was already feeling better.  She had ate her typical breakfast and lunch prior to going shopping.  She doesn't recall feeling her heart racing or skipping a beat.      She had labs that showed normal CMP/CBC/troponin.  She had normal xray of chest.  She also had normal CT head (non contrast).      She was concerned because she had episode in March where she fell/hit her head and when an MRI was done they saw a remote stroke.      She had an echo on 8/13 that did show positive bubble study-otherwise normal EF noted.  She had a holter monitor which she is still wearing-turns in next Tuesday.  She has follow up with cardiology next week.  She was wearing her holter monitor when she had the episode at the store.      She takes her BP at home-this AM was 126/83 with HR of 72.  She feels ok today and doesn't have any reoccurrence of symptoms.      She was suppose to go to [facility] in 10/2024 and now with her health and  having issues with vertigo she wants to cancel and get a note to get reimbursed for her travel expenses.       Review of Systems   Constitutional:  Negative for chills, fatigue and fever.   HENT:  Negative for congestion, ear discharge and ear pain.    Eyes:  Negative for visual disturbance.   Respiratory:  Negative for cough, chest tightness, shortness of breath and wheezing.    Cardiovascular:  Negative for chest  pain, palpitations and leg swelling.   Genitourinary:  Negative for difficulty urinating, dysuria, frequency and urgency.   Neurological:  Positive for syncope and light-headedness. Negative for dizziness, tremors, seizures, facial asymmetry, speech difficulty, weakness, numbness and headaches.       Objective   /83 Comment: home  Pulse 74   Resp 18   Wt 63.6 kg (140 lb 3.2 oz)   BMI 24.23 kg/m²    Physical Exam  Constitutional:       Appearance: Normal appearance.   Neck:      Vascular: No carotid bruit.   Cardiovascular:      Rate and Rhythm: Normal rate and regular rhythm.      Pulses: Normal pulses.      Heart sounds: Normal heart sounds. No murmur heard.     No friction rub. No gallop.   Pulmonary:      Effort: Pulmonary effort is normal.      Breath sounds: Normal breath sounds.   Abdominal:      General: Bowel sounds are normal.      Palpations: Abdomen is soft.   Musculoskeletal:      Right lower leg: No edema.      Left lower leg: No edema.   Lymphadenopathy:      Cervical: No cervical adenopathy.   Neurological:      Mental Status: She is alert and oriented to person, place, and time.      Cranial Nerves: No cranial nerve deficit.      Sensory: No sensory deficit.      Motor: No weakness.      Coordination: Coordination normal.      Gait: Gait normal.      Deep Tendon Reflexes: Reflexes normal.   Psychiatric:         Mood and Affect: Mood normal.         Assessment/Plan   Problem List Items Addressed This Visit       Near syncope - Primary     Unclear the cause of this event  She is currently wearing a Holter monitor and I explained to patient concern for possible arrhythmia as cause of event  She is feeling better at this time  She has follow up with cardiology next week  She did discuss event with neurology who didn't feel it was neurologically related   Due to her current work up I did provide her with a note to submit to airline for her upcoming trip which she wishes to cancel/postpone at  this time-this is reasonable as we await further work up

## 2024-08-21 NOTE — PATIENT INSTRUCTIONS
Continue the holter monitor and return for us to review   See cardiology next week to address current issues  Do not feel d-dimer is warranted as no signs of respiratory distress or chest symptoms  You are ok to drive as long as you are feeling better  Follow up here based on results

## 2024-08-28 ENCOUNTER — HOSPITAL ENCOUNTER (OUTPATIENT)
Dept: VASCULAR MEDICINE | Facility: CLINIC | Age: 74
Discharge: HOME | End: 2024-08-28
Payer: MEDICARE

## 2024-08-28 DIAGNOSIS — R55 NEAR SYNCOPE: ICD-10-CM

## 2024-08-28 DIAGNOSIS — R09.89 OTHER SPECIFIED SYMPTOMS AND SIGNS INVOLVING THE CIRCULATORY AND RESPIRATORY SYSTEMS: ICD-10-CM

## 2024-08-28 DIAGNOSIS — I63.9 CEREBRAL INFARCTION, UNSPECIFIED (MULTI): ICD-10-CM

## 2024-08-28 PROCEDURE — 93880 EXTRACRANIAL BILAT STUDY: CPT | Performed by: INTERNAL MEDICINE

## 2024-08-29 DIAGNOSIS — E04.1 THYROID NODULE: Primary | ICD-10-CM

## 2024-08-31 ENCOUNTER — HOSPITAL ENCOUNTER (OUTPATIENT)
Dept: RADIOLOGY | Facility: HOSPITAL | Age: 74
Discharge: HOME | End: 2024-08-31
Payer: MEDICARE

## 2024-08-31 DIAGNOSIS — E04.1 THYROID NODULE: ICD-10-CM

## 2024-08-31 PROCEDURE — 76536 US EXAM OF HEAD AND NECK: CPT | Performed by: RADIOLOGY

## 2024-08-31 PROCEDURE — 76536 US EXAM OF HEAD AND NECK: CPT

## 2024-09-16 DIAGNOSIS — G25.81 RESTLESS LEGS: ICD-10-CM

## 2024-09-16 RX ORDER — FERROUS SULFATE TAB 325 MG (65 MG ELEMENTAL FE) 325 (65 FE) MG
1 TAB ORAL 2 TIMES DAILY
Qty: 30 TABLET | Refills: 0 | Status: SHIPPED | OUTPATIENT
Start: 2024-09-16

## 2024-09-18 ENCOUNTER — APPOINTMENT (OUTPATIENT)
Dept: OBSTETRICS AND GYNECOLOGY | Facility: CLINIC | Age: 74
End: 2024-09-18
Payer: MEDICARE

## 2024-09-18 VITALS
HEIGHT: 63 IN | BODY MASS INDEX: 24.8 KG/M2 | DIASTOLIC BLOOD PRESSURE: 70 MMHG | SYSTOLIC BLOOD PRESSURE: 120 MMHG | WEIGHT: 140 LBS

## 2024-09-18 DIAGNOSIS — Z01.419 ENCOUNTER FOR GYNECOLOGICAL EXAMINATION WITHOUT ABNORMAL FINDING: Primary | ICD-10-CM

## 2024-09-18 DIAGNOSIS — M85.80 OSTEOPENIA, UNSPECIFIED LOCATION: ICD-10-CM

## 2024-09-18 RX ORDER — ASPIRIN 81 MG/1
81 TABLET ORAL DAILY
COMMUNITY

## 2024-09-18 NOTE — PROGRESS NOTES
"Subjective   Umu Haque is a 73 y.o. female here for GYN care.  She has no postmenopausal bleeding or pelvic pain.  No dysuria, no change in bowel habits or vaginal discharge.  She has no current yeast symptoms.    We did discuss occasional nocturia.    She is current on her colonoscopy.    Personal health questionnaire reviewed: yes.   Her  has been struggling with his health, may be \"long COVID\".   Gynecologic History  No LMP recorded (lmp unknown). Patient is postmenopausal.  Contraception: post menopausal status  Last Pap: 17. Results were: normal  Last mammogram: 5/15/23. Results were: normal    Obstetric History  OB History    Para Term  AB Living   0 0 0 0 0 0   SAB IAB Ectopic Multiple Live Births   0 0 0 0 0       Objective   Constitutional: Alert and in no acute distress. Well developed, well nourished.   Head and Face: Head and face: Normal.    Eyes: Normal external exam - nonicteric sclera, extraocular movements intact (EOMI) and no ptosis.   Neck: No neck asymmetry. Supple. Thyroid not enlarged and there were no palpable thyroid nodules.    Pulmonary: No respiratory distress.   Chest: Breasts: Normal appearance, no nipple discharge and no skin changes. Palpation of breasts and axillae: No palpable mass and no axillary lymphadenopathy.   Abdomen: Soft nontender; no abdominal mass palpated. No organomegaly. No hernias.   Genitourinary: External genitalia: Normal. No inguinal lymphadenopathy. Bartholin's Urethral and Skenes Glands: Normal. Urethra: Normal.  Bladder: Normal on palpation. Vagina: Normal. Cervix: Normal.  Uterus: Normal.  Right Adnexa/parametria: Normal.  Left Adnexa/parametria: Normal.  Inspection of Perianal Area: Normal.   Musculoskeletal: No joint swelling seen, normal movements of all extremities.   Skin: Normal skin color and pigmentation, normal skin turgor, and no rash.   Neurologic: Non-focal. Grossly intact.   Psychiatric: Alert and oriented x 3. " Affect normal to patient baseline. Mood: Appropriate.  Physical Exam     Assessment/Plan   This is a 73-year-old female with a normal exam.  No Pap smear was sent, she had a Pap that was normal in 2017 and no further Pap smears should be necessary.    She has osteopenia on a bone density from October 2023, her next bone density test is due in October 2025.  I do recommend dietary calcium, vitamin D supplement and weightbearing exercise for bone health and menopause.    I will see her in 2 years, or sooner as needed.    Mammogram ordered.

## 2024-10-18 ENCOUNTER — LAB (OUTPATIENT)
Dept: LAB | Facility: LAB | Age: 74
End: 2024-10-18
Payer: MEDICARE

## 2024-10-18 DIAGNOSIS — G25.81 RESTLESS LEGS: ICD-10-CM

## 2024-10-18 DIAGNOSIS — E78.5 HYPERLIPIDEMIA, UNSPECIFIED HYPERLIPIDEMIA TYPE: ICD-10-CM

## 2024-10-18 DIAGNOSIS — D50.9 IRON DEFICIENCY ANEMIA, UNSPECIFIED IRON DEFICIENCY ANEMIA TYPE: ICD-10-CM

## 2024-10-18 LAB
CHOLEST SERPL-MCNC: 162 MG/DL (ref 0–199)
CHOLESTEROL/HDL RATIO: 2.5
FERRITIN SERPL-MCNC: 91 NG/ML (ref 8–150)
HDLC SERPL-MCNC: 64.2 MG/DL
LDLC SERPL CALC-MCNC: 75 MG/DL
NON HDL CHOLESTEROL: 98 MG/DL (ref 0–149)
TRIGL SERPL-MCNC: 112 MG/DL (ref 0–149)
VLDL: 22 MG/DL (ref 0–40)

## 2024-10-18 PROCEDURE — 82728 ASSAY OF FERRITIN: CPT

## 2024-10-18 PROCEDURE — 36415 COLL VENOUS BLD VENIPUNCTURE: CPT

## 2024-10-18 PROCEDURE — 80061 LIPID PANEL: CPT

## 2024-10-21 DIAGNOSIS — R79.0 LOW FERRITIN: ICD-10-CM

## 2024-10-21 DIAGNOSIS — G25.81 RESTLESS LEGS: ICD-10-CM

## 2024-11-20 ENCOUNTER — PATIENT MESSAGE (OUTPATIENT)
Dept: PRIMARY CARE | Facility: CLINIC | Age: 74
End: 2024-11-20

## 2024-11-20 ENCOUNTER — OFFICE VISIT (OUTPATIENT)
Dept: PRIMARY CARE | Facility: CLINIC | Age: 74
End: 2024-11-20
Payer: MEDICARE

## 2024-11-20 VITALS
WEIGHT: 137.9 LBS | BODY MASS INDEX: 24.43 KG/M2 | OXYGEN SATURATION: 95 % | RESPIRATION RATE: 14 BRPM | SYSTOLIC BLOOD PRESSURE: 161 MMHG | DIASTOLIC BLOOD PRESSURE: 77 MMHG | HEART RATE: 83 BPM

## 2024-11-20 DIAGNOSIS — I63.9 CEREBELLAR INFARCT (MULTI): ICD-10-CM

## 2024-11-20 DIAGNOSIS — R26.89 BALANCE DISORDER: ICD-10-CM

## 2024-11-20 DIAGNOSIS — E78.5 HYPERLIPIDEMIA, UNSPECIFIED HYPERLIPIDEMIA TYPE: ICD-10-CM

## 2024-11-20 DIAGNOSIS — E16.2 HYPOGLYCEMIA: Primary | ICD-10-CM

## 2024-11-20 DIAGNOSIS — R41.89 BRAIN FOG: ICD-10-CM

## 2024-11-20 PROCEDURE — 1160F RVW MEDS BY RX/DR IN RCRD: CPT | Performed by: INTERNAL MEDICINE

## 2024-11-20 PROCEDURE — 1123F ACP DISCUSS/DSCN MKR DOCD: CPT | Performed by: INTERNAL MEDICINE

## 2024-11-20 PROCEDURE — 1159F MED LIST DOCD IN RCRD: CPT | Performed by: INTERNAL MEDICINE

## 2024-11-20 PROCEDURE — 99214 OFFICE O/P EST MOD 30 MIN: CPT | Performed by: INTERNAL MEDICINE

## 2024-11-20 RX ORDER — ATORVASTATIN CALCIUM 20 MG/1
40 TABLET, FILM COATED ORAL DAILY
Status: SHIPPED
Start: 2024-11-20

## 2024-11-20 RX ORDER — BLOOD-GLUCOSE SENSOR
EACH MISCELLANEOUS
Qty: 1 EACH | Refills: 0 | Status: SHIPPED | OUTPATIENT
Start: 2024-11-20

## 2024-11-20 RX ORDER — BLOOD-GLUCOSE,RECEIVER,CONT
EACH MISCELLANEOUS
Qty: 1 EACH | Refills: 0 | Status: SHIPPED | OUTPATIENT
Start: 2024-11-20

## 2024-11-20 ASSESSMENT — ENCOUNTER SYMPTOMS
LIGHT-HEADEDNESS: 0
SEIZURES: 0
TREMORS: 0
DIZZINESS: 0
WEAKNESS: 0
HEADACHES: 0
NUMBNESS: 0
SPEECH DIFFICULTY: 0
FACIAL ASYMMETRY: 0

## 2024-11-20 ASSESSMENT — PATIENT HEALTH QUESTIONNAIRE - PHQ9
1. LITTLE INTEREST OR PLEASURE IN DOING THINGS: NOT AT ALL
SUM OF ALL RESPONSES TO PHQ9 QUESTIONS 1 AND 2: 0
2. FEELING DOWN, DEPRESSED OR HOPELESS: NOT AT ALL

## 2024-11-20 NOTE — PATIENT INSTRUCTIONS
Can trial off statin for period of 2-4 weeks to see how you feel   Get the [employer] jim monitor to see if sugar is an issue  Based on how you are feeling over next 1 month we can decide to get back to neuro as I am concerned this is residual from previous stroke to base of brain (cerebellar)

## 2024-11-20 NOTE — PROGRESS NOTES
Subjective   Patient ID: Umu Haque is a 74 y.o. female who presents for Follow-up (Dizziness ).    HPI  Pt here in acute visit.  She is having dizziness again.  She reminds me early in year (March) this started-bumping into doors.  She did vestibular PT which didn't help much.  Eventually this all resolved in early summer.  About Sept she started to have the sensation of being off balanced/drifting into walls when turning in the house.   No vertigo.  She has sensation of her head being fuzzy.  No sinus pain/pressure.   At night she almost feels as if she had alcohol (2 glasses of wine) but she hasn't.  She had MRI in April that showed old cerebellar infarct.      She had an ocular migraine in the past month.      She has home BP readings that are 105-115/70's.  Today was 115/73 this AM.  Here she is much higher and her cuff seems fairly accurate.  She feels sense of anxiety/white coat syndrome.          Review of Systems   HENT:  Negative for ear discharge, ear pain and tinnitus.    Eyes:  Negative for visual disturbance.   Neurological:  Negative for dizziness, tremors, seizures, syncope, facial asymmetry, speech difficulty, weakness, light-headedness, numbness and headaches.        Off balanced        Objective   /77 (BP Location: Left arm, Patient Position: Standing)   Pulse 83   Resp 14   Wt 62.6 kg (137 lb 14.4 oz)   LMP  (LMP Unknown)   SpO2 95%   BMI 24.43 kg/m²    Physical Exam  Constitutional:       Appearance: Normal appearance.   Cardiovascular:      Rate and Rhythm: Normal rate and regular rhythm.      Pulses: Normal pulses.      Heart sounds: Normal heart sounds.   Pulmonary:      Effort: Pulmonary effort is normal.      Breath sounds: Normal breath sounds.   Musculoskeletal:         General: Normal range of motion.   Neurological:      General: No focal deficit present.      Mental Status: She is alert and oriented to person, place, and time. Mental status is at baseline.       Cranial Nerves: No cranial nerve deficit.      Sensory: No sensory deficit.      Motor: No weakness.      Coordination: Coordination normal.      Gait: Gait normal.      Deep Tendon Reflexes: Reflexes normal.   Psychiatric:         Mood and Affect: Mood normal.         Assessment/Plan   Problem List Items Addressed This Visit       Hyperlipidemia    Relevant Medications    atorvastatin (Lipitor) 20 mg tablet    Cerebellar infarct (Multi)     Explained to patient I believe her symptoms of being off balanced/brain fog are likely related to previous cerebellar infarct noted from MRI back in 4/2024  She is on ASA/statin for this but she feels something else is cause  She would like to trial off statin to see if cause-told her ok to do so for 2-4 weeks and see how she feels while off medication  Continue ASA  She wishes to monitor sugars so we did order imelda device but explained does not correlate with symptoms she is having  Would like her to consider thorough ear/hearing/eye exams along with seeing neuro again           Other Visit Diagnoses       Hypoglycemia    -  Primary    Relevant Medications    FreeStyle Imelda 3 Sensor device    FreeStyle Imelda 3 Clever misc    Brain fog        Balance disorder

## 2024-11-20 NOTE — ASSESSMENT & PLAN NOTE
Explained to patient I believe her symptoms of being off balanced/brain fog are likely related to previous cerebellar infarct noted from MRI back in 4/2024  She is on ASA/statin for this but she feels something else is cause  She would like to trial off statin to see if cause-told her ok to do so for 2-4 weeks and see how she feels while off medication  Continue ASA  She wishes to monitor sugars so we did order jim device but explained does not correlate with symptoms she is having  Would like her to consider thorough ear/hearing/eye exams along with seeing neuro again

## 2024-11-21 RX ORDER — FLASH GLUCOSE SCANNING READER
EACH MISCELLANEOUS
Qty: 1 EACH | Refills: 0 | Status: SHIPPED | OUTPATIENT
Start: 2024-11-21

## 2024-11-21 RX ORDER — FLASH GLUCOSE SENSOR
KIT MISCELLANEOUS
Qty: 1 EACH | Refills: 0 | Status: SHIPPED | OUTPATIENT
Start: 2024-11-21

## 2025-03-10 ENCOUNTER — HOSPITAL ENCOUNTER (OUTPATIENT)
Dept: RADIOLOGY | Facility: CLINIC | Age: 75
Discharge: HOME | End: 2025-03-10
Payer: MEDICARE

## 2025-03-10 ENCOUNTER — OFFICE VISIT (OUTPATIENT)
Dept: PODIATRY | Facility: CLINIC | Age: 75
End: 2025-03-10
Payer: MEDICARE

## 2025-03-10 DIAGNOSIS — M79.671 FOOT PAIN, RIGHT: Primary | ICD-10-CM

## 2025-03-10 DIAGNOSIS — M21.6X1 ACQUIRED EQUINUS DEFORMITY OF BOTH FEET: ICD-10-CM

## 2025-03-10 DIAGNOSIS — M21.6X2 ACQUIRED EQUINUS DEFORMITY OF BOTH FEET: ICD-10-CM

## 2025-03-10 DIAGNOSIS — M21.6X2 ACQUIRED BILATERAL PES CAVUS: ICD-10-CM

## 2025-03-10 DIAGNOSIS — M72.2 PLANTAR FASCIITIS: ICD-10-CM

## 2025-03-10 DIAGNOSIS — R26.89 ANTALGIC GAIT: ICD-10-CM

## 2025-03-10 DIAGNOSIS — M21.6X1 ACQUIRED BILATERAL PES CAVUS: ICD-10-CM

## 2025-03-10 PROCEDURE — 99204 OFFICE O/P NEW MOD 45 MIN: CPT | Performed by: PODIATRIST

## 2025-03-10 PROCEDURE — 1160F RVW MEDS BY RX/DR IN RCRD: CPT | Performed by: PODIATRIST

## 2025-03-10 PROCEDURE — 1036F TOBACCO NON-USER: CPT | Performed by: PODIATRIST

## 2025-03-10 PROCEDURE — 1123F ACP DISCUSS/DSCN MKR DOCD: CPT | Performed by: PODIATRIST

## 2025-03-10 PROCEDURE — 1159F MED LIST DOCD IN RCRD: CPT | Performed by: PODIATRIST

## 2025-03-10 PROCEDURE — 73630 X-RAY EXAM OF FOOT: CPT | Mod: RT

## 2025-03-10 PROCEDURE — 99214 OFFICE O/P EST MOD 30 MIN: CPT | Performed by: PODIATRIST

## 2025-03-10 ASSESSMENT — ENCOUNTER SYMPTOMS
PSYCHIATRIC NEGATIVE: 1
ALLERGIC/IMMUNOLOGIC NEGATIVE: 1
ENDOCRINE NEGATIVE: 1
EYES NEGATIVE: 1
CONSTITUTIONAL NEGATIVE: 1
HEMATOLOGIC/LYMPHATIC NEGATIVE: 1
CARDIOVASCULAR NEGATIVE: 1
GASTROINTESTINAL NEGATIVE: 1
RESPIRATORY NEGATIVE: 1

## 2025-03-10 NOTE — PROGRESS NOTES
"Chief Complaint   Patient presents with    Foot Problem     R FOOT HEAL PAIN     Thinks she has plantar fascitis.   Right heel.   Duration many months. Unknown exactly when insidious onset.   No known trauma.  Post attic dyskinesia.  Clinical educator.   HLD, hypoglycemic, hypothyroid. \"Minor stroke\" 2017. Dizziness. Etiology? Quasi-Syncopal episode  Non-smoker.  Review of Systems   Constitutional: Negative.    HENT: Negative.     Eyes: Negative.    Respiratory: Negative.     Cardiovascular: Negative.    Gastrointestinal: Negative.    Endocrine: Negative.    Genitourinary: Negative.    Musculoskeletal:  Positive for gait problem.   Skin: Negative.    Allergic/Immunologic: Negative.    Hematological: Negative.    Psychiatric/Behavioral: Negative.       General/Constitutional: Alert. NAD.   Respiratory: Non labored breathing.   Psychiatric: Mood and affect normal/baseline.   HEENT: Sclera clear. Wearing corrective lenses.  Dermatologic: Skin and nails intact. No acute inflammatory infectious process. Web spaces are dry. No ulcers no pre-ulcerative areas.  Vascular: Pedal pulses are intact and symmetric including the dorsalis pedis and the posterior tibial pulses. Feet are warm to touch. No swelling appreciated either extremity.  Neurological: Alert and oriented. No acute distress. No sensory impairment bilateral.  Musculoskeletal: Strength is normal for age. No acute deficits appreciated.  Significant rigid pes cavus deformity bilaterally.  Equinus deformity.  Pain on palpation plantar right heel.  Consistent plantar fasciitis.  X-rays demonstrate pes cavus deformity.  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  And calcaneal spurring.    Impression: Plantar fasciitis; heel spur; equinus.     -Today's treatment and course of therapy was discussed with the patient in detail. Patient's questions were answered. Proper foot care was discussed. This dictation was done using Dragon computer software and as such may contain " grammatical errors.    -Review x-rays with patient in detail.  She is understanding of the same.    -Offered corticosteroid injection she declines at this time.    -I did discuss stretching the Achilles tendon flexing the foot up or using exercise band.  Also ice over the area couple times a day will be helpful.  Avoid walking barefoot your athletic shoes are a good idea.  You can also use a gel cushion for the heel.    -Over-the-counter anti-inflammatory such as ibuprofen on a regular basis    -Dispensed a night splint.

## 2025-03-30 DIAGNOSIS — E78.5 HYPERLIPIDEMIA, UNSPECIFIED HYPERLIPIDEMIA TYPE: ICD-10-CM

## 2025-03-31 RX ORDER — ATORVASTATIN CALCIUM 20 MG/1
75 TABLET, FILM COATED ORAL DAILY
Qty: 135 TABLET | Refills: 0 | OUTPATIENT
Start: 2025-03-31

## 2025-03-31 RX ORDER — ATORVASTATIN CALCIUM 40 MG/1
40 TABLET, FILM COATED ORAL DAILY
Qty: 90 TABLET | Refills: 3 | Status: SHIPPED | OUTPATIENT
Start: 2025-03-31

## 2025-04-04 ENCOUNTER — HOSPITAL ENCOUNTER (OUTPATIENT)
Dept: RADIOLOGY | Facility: CLINIC | Age: 75
Discharge: HOME | End: 2025-04-04
Payer: MEDICARE

## 2025-04-04 ENCOUNTER — APPOINTMENT (OUTPATIENT)
Dept: PRIMARY CARE | Facility: CLINIC | Age: 75
End: 2025-04-04
Payer: MEDICARE

## 2025-04-04 VITALS
OXYGEN SATURATION: 98 % | RESPIRATION RATE: 12 BRPM | BODY MASS INDEX: 24.91 KG/M2 | HEIGHT: 63 IN | DIASTOLIC BLOOD PRESSURE: 73 MMHG | WEIGHT: 140.6 LBS | TEMPERATURE: 98.3 F | SYSTOLIC BLOOD PRESSURE: 145 MMHG | HEART RATE: 72 BPM

## 2025-04-04 DIAGNOSIS — E61.1 IRON DEFICIENCY: ICD-10-CM

## 2025-04-04 DIAGNOSIS — M85.80 OSTEOPENIA, UNSPECIFIED LOCATION: ICD-10-CM

## 2025-04-04 DIAGNOSIS — R53.83 FATIGUE, UNSPECIFIED TYPE: ICD-10-CM

## 2025-04-04 DIAGNOSIS — I25.10 CORONARY ARTERY DISEASE INVOLVING NATIVE CORONARY ARTERY OF NATIVE HEART WITHOUT ANGINA PECTORIS: ICD-10-CM

## 2025-04-04 DIAGNOSIS — Z23 NEED FOR PNEUMOCOCCAL 20-VALENT CONJUGATE VACCINATION: ICD-10-CM

## 2025-04-04 DIAGNOSIS — D35.2 PITUITARY ADENOMA (MULTI): ICD-10-CM

## 2025-04-04 DIAGNOSIS — E78.2 MIXED HYPERLIPIDEMIA: ICD-10-CM

## 2025-04-04 DIAGNOSIS — Z00.00 MEDICARE ANNUAL WELLNESS VISIT, SUBSEQUENT: Primary | ICD-10-CM

## 2025-04-04 DIAGNOSIS — Z78.0 POST-MENOPAUSE: ICD-10-CM

## 2025-04-04 DIAGNOSIS — E03.9 ACQUIRED HYPOTHYROIDISM: ICD-10-CM

## 2025-04-04 DIAGNOSIS — R25.2 LEG CRAMPS: ICD-10-CM

## 2025-04-04 DIAGNOSIS — Z12.31 ENCOUNTER FOR SCREENING MAMMOGRAM FOR MALIGNANT NEOPLASM OF BREAST: ICD-10-CM

## 2025-04-04 LAB
25(OH)D3+25(OH)D2 SERPL-MCNC: 61 NG/ML (ref 30–100)
ALBUMIN SERPL-MCNC: 4.8 G/DL (ref 3.6–5.1)
ALP SERPL-CCNC: 82 U/L (ref 37–153)
ALT SERPL-CCNC: 15 U/L (ref 6–29)
ANION GAP SERPL CALCULATED.4IONS-SCNC: 12 MMOL/L (CALC) (ref 7–17)
APPEARANCE UR: CLEAR
AST SERPL-CCNC: 22 U/L (ref 10–35)
BILIRUB SERPL-MCNC: 1 MG/DL (ref 0.2–1.2)
BILIRUB UR QL STRIP: NEGATIVE
BUN SERPL-MCNC: 14 MG/DL (ref 7–25)
CALCIUM SERPL-MCNC: 9.8 MG/DL (ref 8.6–10.4)
CHLORIDE SERPL-SCNC: 101 MMOL/L (ref 98–110)
CHOLEST SERPL-MCNC: 175 MG/DL
CHOLEST/HDLC SERPL: 2.3 (CALC)
CO2 SERPL-SCNC: 27 MMOL/L (ref 20–32)
COLOR UR: YELLOW
CREAT SERPL-MCNC: 0.77 MG/DL (ref 0.6–1)
EGFRCR SERPLBLD CKD-EPI 2021: 80 ML/MIN/1.73M2
ERYTHROCYTE [DISTWIDTH] IN BLOOD BY AUTOMATED COUNT: 12 % (ref 11–15)
FERRITIN SERPL-MCNC: 51 NG/ML (ref 16–288)
FOLATE SERPL-MCNC: 13.6 NG/ML
GLUCOSE SERPL-MCNC: 96 MG/DL (ref 65–99)
GLUCOSE UR QL STRIP: NEGATIVE
HCT VFR BLD AUTO: 43.5 % (ref 35–45)
HDLC SERPL-MCNC: 77 MG/DL
HGB BLD-MCNC: 14.6 G/DL (ref 11.7–15.5)
HGB UR QL STRIP: NEGATIVE
IRON SATN MFR SERPL: 31 % (CALC) (ref 16–45)
IRON SERPL-MCNC: 121 MCG/DL (ref 45–160)
KETONES UR QL STRIP: NEGATIVE
LDLC SERPL CALC-MCNC: 77 MG/DL (CALC)
LEUKOCYTE ESTERASE UR QL STRIP: NEGATIVE
MAGNESIUM SERPL-MCNC: 2.1 MG/DL (ref 1.5–2.5)
MCH RBC QN AUTO: 32.4 PG (ref 27–33)
MCHC RBC AUTO-ENTMCNC: 33.6 G/DL (ref 32–36)
MCV RBC AUTO: 96.7 FL (ref 80–100)
NITRITE UR QL STRIP: NEGATIVE
NONHDLC SERPL-MCNC: 98 MG/DL (CALC)
PH UR STRIP: 6.5 [PH] (ref 5–8)
PLATELET # BLD AUTO: 196 THOUSAND/UL (ref 140–400)
PMV BLD REES-ECKER: 11.3 FL (ref 7.5–12.5)
POTASSIUM SERPL-SCNC: 4.6 MMOL/L (ref 3.5–5.3)
PROT SERPL-MCNC: 7.1 G/DL (ref 6.1–8.1)
PROT UR QL STRIP: NEGATIVE
RBC # BLD AUTO: 4.5 MILLION/UL (ref 3.8–5.1)
SODIUM SERPL-SCNC: 140 MMOL/L (ref 135–146)
SP GR UR STRIP: 1.01 (ref 1–1.03)
TIBC SERPL-MCNC: 394 MCG/DL (CALC) (ref 250–450)
TRIGL SERPL-MCNC: 124 MG/DL
TSH SERPL-ACNC: 2.66 MIU/L (ref 0.4–4.5)
VIT B12 SERPL-MCNC: 582 PG/ML (ref 200–1100)
WBC # BLD AUTO: 5.1 THOUSAND/UL (ref 3.8–10.8)

## 2025-04-04 PROCEDURE — 99214 OFFICE O/P EST MOD 30 MIN: CPT | Performed by: INTERNAL MEDICINE

## 2025-04-04 PROCEDURE — G0009 ADMIN PNEUMOCOCCAL VACCINE: HCPCS | Performed by: INTERNAL MEDICINE

## 2025-04-04 PROCEDURE — 1159F MED LIST DOCD IN RCRD: CPT | Performed by: INTERNAL MEDICINE

## 2025-04-04 PROCEDURE — 1123F ACP DISCUSS/DSCN MKR DOCD: CPT | Performed by: INTERNAL MEDICINE

## 2025-04-04 PROCEDURE — 1158F ADVNC CARE PLAN TLK DOCD: CPT | Performed by: INTERNAL MEDICINE

## 2025-04-04 PROCEDURE — 1160F RVW MEDS BY RX/DR IN RCRD: CPT | Performed by: INTERNAL MEDICINE

## 2025-04-04 PROCEDURE — 90677 PCV20 VACCINE IM: CPT | Performed by: INTERNAL MEDICINE

## 2025-04-04 PROCEDURE — 77063 BREAST TOMOSYNTHESIS BI: CPT

## 2025-04-04 PROCEDURE — G0439 PPPS, SUBSEQ VISIT: HCPCS | Performed by: INTERNAL MEDICINE

## 2025-04-04 PROCEDURE — 1170F FXNL STATUS ASSESSED: CPT | Performed by: INTERNAL MEDICINE

## 2025-04-04 PROCEDURE — 1036F TOBACCO NON-USER: CPT | Performed by: INTERNAL MEDICINE

## 2025-04-04 ASSESSMENT — ENCOUNTER SYMPTOMS
EYE REDNESS: 0
APPETITE CHANGE: 0
SLEEP DISTURBANCE: 0
DECREASED CONCENTRATION: 0
ARTHRALGIAS: 0
DIARRHEA: 0
TROUBLE SWALLOWING: 0
NUMBNESS: 0
FACIAL SWELLING: 0
COUGH: 0
EYE DISCHARGE: 0
VOMITING: 0
FATIGUE: 0
CONSTIPATION: 0
WEAKNESS: 0
DIFFICULTY URINATING: 0
CONFUSION: 0
VOICE CHANGE: 0
CHEST TIGHTNESS: 0
UNEXPECTED WEIGHT CHANGE: 0
WHEEZING: 0
SHORTNESS OF BREATH: 0
DEPRESSION: 0
BLOOD IN STOOL: 0
FLANK PAIN: 0
HEADACHES: 0
EYE PAIN: 0
COLOR CHANGE: 0
POLYDIPSIA: 0
ABDOMINAL PAIN: 0
NECK PAIN: 0
CHOKING: 0
HEMATURIA: 0
PHOTOPHOBIA: 0
EYE ITCHING: 0
POLYPHAGIA: 0
JOINT SWELLING: 0
NAUSEA: 0
WOUND: 0
ANAL BLEEDING: 0
BRUISES/BLEEDS EASILY: 0
ADENOPATHY: 0
PALPITATIONS: 0
FEVER: 0
SINUS PAIN: 0
SINUS PRESSURE: 0
RHINORRHEA: 0
RECTAL PAIN: 0
STRIDOR: 0
SEIZURES: 0
BACK PAIN: 0
FREQUENCY: 0
SORE THROAT: 0
NERVOUS/ANXIOUS: 0
MYALGIAS: 0
LOSS OF SENSATION IN FEET: 0
DIZZINESS: 0
DYSURIA: 0
DYSPHORIC MOOD: 0
OCCASIONAL FEELINGS OF UNSTEADINESS: 0
APNEA: 0
LIGHT-HEADEDNESS: 0
ACTIVITY CHANGE: 0
DIAPHORESIS: 0
ABDOMINAL DISTENTION: 0
CHILLS: 0

## 2025-04-04 ASSESSMENT — ACTIVITIES OF DAILY LIVING (ADL)
DOING_HOUSEWORK: INDEPENDENT
DRESSING: INDEPENDENT
GROCERY_SHOPPING: INDEPENDENT
BATHING: INDEPENDENT
MANAGING_FINANCES: INDEPENDENT
TAKING_MEDICATION: INDEPENDENT

## 2025-04-04 NOTE — ASSESSMENT & PLAN NOTE
Followed with neuro  She has had MRI's to follow it-very small and benign appearing in 2023   Asymptomatic

## 2025-04-04 NOTE — ASSESSMENT & PLAN NOTE
Repeat dexa due later this year-order given  On calcium/vitamin D   Exercises regularly   Orders:    Vitamin D 25-Hydroxy,Total (for eval of Vitamin D levels); Future

## 2025-04-04 NOTE — PROGRESS NOTES
Subjective   Reason for Visit: Umu Haque is an 74 y.o. female here for a Medicare Wellness visit.     Past Medical, Surgical, and Family History reviewed and updated in chart.    Reviewed all medications by prescribing practitioner or clinical pharmacist (such as prescriptions, OTCs, herbal therapies and supplements) and documented in the medical record.    HPI  Pt here for MWV.  Her weight is stable.      She did wear the continuous glucose meter for 2 weeks and didn't note any fluctuations.    She saw cardio last week-she tests her BP at home and average was 122/72.  She has element of white coat when coming to physician office.      She last had mammogram in May of 2023.  She is having a new one done today-no breast issues.    She sees Dr. Rain for GYN care every few years-has an appt in 9/2025.  No pelvic or urinary issues.    She has osteopenia seen on dexa in 10/2023.  No falls. She is on vitamin D/calcium.  She walks and exercises regularly.      She had normal colonoscopy in 2021.  Repeat noted for 10 years.  No GI or bowel issues.      She saw allergist beginning of the year.  They recommend nasal spray.      She saw podiatry in mid March due to plantar fasciitis and heel spur.  She is wearing a night splint which has really helped.  She has follow up coming up.        Patient Care Team:  Liyah QUEZADA DO as PCP - General  Liyah QUEZADA DO [place] PCP [place] [facility] [place] [facility] [place]     Review of Systems   Constitutional:  Negative for activity change, appetite change, chills, diaphoresis, fatigue, fever and unexpected weight change.   HENT:  Negative for congestion, dental problem, drooling, ear discharge, ear pain, facial swelling, hearing loss, mouth sores, nosebleeds, postnasal drip, rhinorrhea, sinus pressure, sinus pain, sneezing, sore throat, tinnitus, trouble swallowing and voice change.    Eyes:  Negative for photophobia, pain, discharge, redness, itching and visual disturbance.  "  Respiratory:  Negative for apnea, cough, choking, chest tightness, shortness of breath, wheezing and stridor.    Cardiovascular:  Negative for chest pain, palpitations and leg swelling.   Gastrointestinal:  Negative for abdominal distention, abdominal pain, anal bleeding, blood in stool, constipation, diarrhea, nausea, rectal pain and vomiting.   Endocrine: Negative for cold intolerance, heat intolerance, polydipsia, polyphagia and polyuria.   Genitourinary:  Negative for decreased urine volume, difficulty urinating, dyspareunia, dysuria, enuresis, flank pain, frequency, genital sores, hematuria and urgency.   Musculoskeletal:  Negative for arthralgias, back pain, joint swelling, myalgias and neck pain.   Skin:  Negative for color change, rash and wound.   Allergic/Immunologic: Negative for environmental allergies.   Neurological:  Negative for dizziness, seizures, syncope, weakness, light-headedness, numbness and headaches.   Hematological:  Negative for adenopathy. Does not bruise/bleed easily.   Psychiatric/Behavioral:  Negative for confusion, decreased concentration, dysphoric mood and sleep disturbance. The patient is not nervous/anxious.        Objective   Vitals:  /73 (BP Location: Left arm, Patient Position: Sitting)   Pulse 72   Temp 36.8 °C (98.3 °F) (Oral)   Resp 12   Ht 1.594 m (5' 2.75\")   Wt 63.8 kg (140 lb 9.6 oz)   LMP  (LMP Unknown)   SpO2 98%   BMI 25.11 kg/m²       Physical Exam  Constitutional:       Appearance: Normal appearance.   HENT:      Head: Normocephalic and atraumatic.      Right Ear: Tympanic membrane, ear canal and external ear normal. There is no impacted cerumen.      Left Ear: Tympanic membrane, ear canal and external ear normal. There is no impacted cerumen.      Nose: Nose normal. No congestion or rhinorrhea.      Mouth/Throat:      Mouth: Mucous membranes are moist.      Pharynx: Oropharynx is clear. No oropharyngeal exudate or posterior oropharyngeal erythema. "   Eyes:      Extraocular Movements: Extraocular movements intact.      Conjunctiva/sclera: Conjunctivae normal.      Pupils: Pupils are equal, round, and reactive to light.   Neck:      Vascular: No carotid bruit.   Cardiovascular:      Rate and Rhythm: Normal rate and regular rhythm.      Pulses: Normal pulses.      Heart sounds: Normal heart sounds. No murmur heard.  Pulmonary:      Effort: Pulmonary effort is normal. No respiratory distress.      Breath sounds: Normal breath sounds. No wheezing, rhonchi or rales.   Abdominal:      General: Abdomen is flat. Bowel sounds are normal. There is no distension.      Palpations: Abdomen is soft.      Tenderness: There is no abdominal tenderness.      Hernia: No hernia is present.   Musculoskeletal:         General: No swelling or tenderness. Normal range of motion.      Cervical back: Normal range of motion and neck supple.      Right lower leg: No edema.      Left lower leg: No edema.   Lymphadenopathy:      Cervical: No cervical adenopathy.   Skin:     General: Skin is warm and dry.      Findings: No lesion or rash.   Neurological:      General: No focal deficit present.      Mental Status: She is alert and oriented to person, place, and time.      Cranial Nerves: No cranial nerve deficit.      Sensory: No sensory deficit.      Motor: No weakness.   Psychiatric:         Mood and Affect: Mood normal.         Behavior: Behavior normal.         Thought Content: Thought content normal.         Judgment: Judgment normal.       Assessment & Plan  Medicare annual wellness visit, subsequent  Prevnar done today to update   Having mammogram done today   Sees GYN later this year    Orders:    Follow Up In Primary Care - Medicare Annual    Follow Up In Primary Care - Medicare Annual; Future    Coronary artery disease involving native coronary artery of native heart without angina pectoris  On ASA/statin therapy   Orders:    Comprehensive Metabolic Panel; Future    CBC; Future     Urinalysis with Reflex Microscopic; Future    Mixed hyperlipidemia  On statin    Orders:    Comprehensive Metabolic Panel; Future    Lipid Panel; Future    Acquired hypothyroidism  On levothyroxine   Orders:    TSH; Future    Osteopenia, unspecified location  Repeat dexa due later this year-order given  On calcium/vitamin D   Exercises regularly   Orders:    Vitamin D 25-Hydroxy,Total (for eval of Vitamin D levels); Future    Post-menopause    Orders:    XR DEXA bone density; Future    Need for pneumococcal 20-valent conjugate vaccination    Orders:    Pneumococcal conjugate vaccine, 20-valent (PREVNAR 20)    Pituitary adenoma (Multi)  Followed with neuro  She has had MRI's to follow it-very small and benign appearing in 2023   Asymptomatic          Fatigue, unspecified type  Minor  Pt would like additional labs checked to ensure all normal   Orders:    Ferritin; Future    Vitamin B12; Future    Iron and TIBC; Future    Folate; Future    Iron deficiency    Orders:    Ferritin; Future    Iron and TIBC; Future    Leg cramps    Orders:    Magnesium; Future    Folate; Future

## 2025-04-04 NOTE — PATIENT INSTRUCTIONS
Get blood work done today and we will call with results  Continue all meds as directed-call when refills needed  We gave you your pneumonia shot today to update you  Continue to see specialist as directed  Continue healthy diet and exercise regularly  Bone density will be due in 10/2025-order given  Get mammogram done today and see GYN later this year  Continue calcium/vitamin D and exercise for bone health  Follow up here in 1 year-sooner if needed

## 2025-04-04 NOTE — ASSESSMENT & PLAN NOTE
On ASA/statin therapy   Orders:    Comprehensive Metabolic Panel; Future    CBC; Future    Urinalysis with Reflex Microscopic; Future

## 2025-04-14 ENCOUNTER — APPOINTMENT (OUTPATIENT)
Dept: PODIATRY | Facility: CLINIC | Age: 75
End: 2025-04-14
Payer: MEDICARE

## 2025-04-15 ENCOUNTER — OFFICE VISIT (OUTPATIENT)
Dept: PODIATRY | Facility: CLINIC | Age: 75
End: 2025-04-15
Payer: MEDICARE

## 2025-04-15 DIAGNOSIS — R26.89 ANTALGIC GAIT: ICD-10-CM

## 2025-04-15 DIAGNOSIS — M21.6X1 ACQUIRED EQUINUS DEFORMITY OF BOTH FEET: ICD-10-CM

## 2025-04-15 DIAGNOSIS — M21.6X2 ACQUIRED BILATERAL PES CAVUS: ICD-10-CM

## 2025-04-15 DIAGNOSIS — M79.671 FOOT PAIN, RIGHT: Primary | ICD-10-CM

## 2025-04-15 DIAGNOSIS — M72.2 PLANTAR FASCIITIS: ICD-10-CM

## 2025-04-15 DIAGNOSIS — M21.6X2 ACQUIRED EQUINUS DEFORMITY OF BOTH FEET: ICD-10-CM

## 2025-04-15 DIAGNOSIS — M21.6X1 ACQUIRED BILATERAL PES CAVUS: ICD-10-CM

## 2025-04-15 PROCEDURE — 1159F MED LIST DOCD IN RCRD: CPT | Performed by: PODIATRIST

## 2025-04-15 PROCEDURE — 1123F ACP DISCUSS/DSCN MKR DOCD: CPT | Performed by: PODIATRIST

## 2025-04-15 PROCEDURE — 1160F RVW MEDS BY RX/DR IN RCRD: CPT | Performed by: PODIATRIST

## 2025-04-15 PROCEDURE — 1036F TOBACCO NON-USER: CPT | Performed by: PODIATRIST

## 2025-04-15 PROCEDURE — 99213 OFFICE O/P EST LOW 20 MIN: CPT | Performed by: PODIATRIST

## 2025-04-15 ASSESSMENT — ENCOUNTER SYMPTOMS
EYES NEGATIVE: 1
ENDOCRINE NEGATIVE: 1
RESPIRATORY NEGATIVE: 1
CONSTITUTIONAL NEGATIVE: 1
GASTROINTESTINAL NEGATIVE: 1
CARDIOVASCULAR NEGATIVE: 1
ALLERGIC/IMMUNOLOGIC NEGATIVE: 1
PSYCHIATRIC NEGATIVE: 1
HEMATOLOGIC/LYMPHATIC NEGATIVE: 1

## 2025-04-15 NOTE — PROGRESS NOTES
"Chief Complaint   Patient presents with    Follow-up     R FOOT PAIN     Chief Complaint   Patient presents with    Follow-up     R FOOT PAIN   Heel pain has improved. \"Occasional sensations\". Night splint helps.  Asks about the heel spur.  Also asks about a callus area in the right hallux.  She is also discovered wider with shoes which are helpful.    Clinical educator.   HLD, hypoglycemic, hypothyroid. \"Minor stroke\" 2017. Dizziness. Etiology? Quasi-Syncopal episode  Non-smoker.  Review of Systems   Constitutional: Negative.    HENT: Negative.     Eyes: Negative.    Respiratory: Negative.     Cardiovascular: Negative.    Gastrointestinal: Negative.    Endocrine: Negative.    Genitourinary: Negative.    Musculoskeletal:  Positive for gait problem.   Skin: Negative.    Allergic/Immunologic: Negative.    Hematological: Negative.    Psychiatric/Behavioral: Negative.       General/Constitutional: Alert. NAD.   Respiratory: Non labored breathing.   Psychiatric: Mood and affect normal/baseline.   HEENT: Sclera clear. Wearing corrective lenses.  Dermatologic: Skin and nails intact.  Mild callus hallux IP joint right great toe.  No acute inflammatory infectious process. Web spaces are dry. No ulcers no pre-ulcerative areas.  Vascular: Pedal pulses are intact and symmetric including the dorsalis pedis and the posterior tibial pulses. Feet are warm to touch. No swelling appreciated either extremity.  Neurological: Alert and oriented. No acute distress. No sensory impairment bilateral.  Musculoskeletal: Strength is normal for age. No acute deficits appreciated.  Significant rigid pes cavus deformity bilaterally.  Equinus deformity.  No palpable heel pain at this time.  No Achilles pain.  Range of motion no pain.    X-rays demonstrate pes cavus deformity.  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  And calcaneal spurring.    Impression: Plantar fasciitis; heel spur; equinus.     -Today's treatment and course of therapy was " discussed with the patient in detail. Patient's questions were answered. Proper foot care was discussed. This dictation was done using Dragon computer software and as such may contain grammatical errors.    -Review x-rays again.     - Again reviewed some stretching exercises using exercise band.  Avoid walking barefoot excessively.  Continue with wide with shoes.    -Over-the-counter anti-inflammatory such as ibuprofen on a regular basis    -Continue with night splint and gradually start weaning yourself off of it to see how you do.  Follow-up as needed

## 2025-05-16 DIAGNOSIS — E03.9 ACQUIRED HYPOTHYROIDISM: ICD-10-CM

## 2025-05-16 RX ORDER — LEVOTHYROXINE SODIUM 125 UG/1
TABLET ORAL
Qty: 45 TABLET | Refills: 3 | Status: SHIPPED | OUTPATIENT
Start: 2025-05-16

## 2025-06-02 ENCOUNTER — TELEPHONE (OUTPATIENT)
Dept: PRIMARY CARE | Facility: CLINIC | Age: 75
End: 2025-06-02
Payer: MEDICARE

## 2025-06-02 NOTE — TELEPHONE ENCOUNTER
She is not in the birth years of concern-it was the years of 1784-2651 when they used a dead version of the virus as the vaccine which was found to be ineffective   Would only check her if she truly feels her parents didn't vaccinate her

## 2025-06-02 NOTE — TELEPHONE ENCOUNTER
Patient called asking if we can do a blood test to check her immunity to measles? Please advise thanks!

## 2025-07-31 ENCOUNTER — CLINICAL SUPPORT (OUTPATIENT)
Dept: AUDIOLOGY | Facility: CLINIC | Age: 75
End: 2025-07-31
Payer: MEDICARE

## 2025-07-31 ENCOUNTER — OFFICE VISIT (OUTPATIENT)
Dept: OTOLARYNGOLOGY | Facility: CLINIC | Age: 75
End: 2025-07-31
Payer: MEDICARE

## 2025-07-31 VITALS
WEIGHT: 142 LBS | HEIGHT: 64 IN | DIASTOLIC BLOOD PRESSURE: 84 MMHG | TEMPERATURE: 97.7 F | BODY MASS INDEX: 24.24 KG/M2 | SYSTOLIC BLOOD PRESSURE: 170 MMHG | HEART RATE: 70 BPM

## 2025-07-31 DIAGNOSIS — H93.11 SUBJECTIVE TINNITUS OF RIGHT EAR: ICD-10-CM

## 2025-07-31 DIAGNOSIS — H90.42 SENSORINEURAL HEARING LOSS (SNHL) OF LEFT EAR WITH UNRESTRICTED HEARING OF RIGHT EAR: Primary | ICD-10-CM

## 2025-07-31 DIAGNOSIS — H91.92 HIGH-FREQUENCY HEARING LOSS OF LEFT EAR: ICD-10-CM

## 2025-07-31 DIAGNOSIS — L29.9 EAR ITCHING: ICD-10-CM

## 2025-07-31 DIAGNOSIS — H93.A1 PULSATILE TINNITUS, RIGHT EAR: Primary | ICD-10-CM

## 2025-07-31 DIAGNOSIS — H61.21 EXCESSIVE CERUMEN IN RIGHT EAR CANAL: ICD-10-CM

## 2025-07-31 PROCEDURE — 1159F MED LIST DOCD IN RCRD: CPT | Performed by: NURSE PRACTITIONER

## 2025-07-31 PROCEDURE — 1126F AMNT PAIN NOTED NONE PRSNT: CPT | Performed by: NURSE PRACTITIONER

## 2025-07-31 PROCEDURE — 92550 TYMPANOMETRY & REFLEX THRESH: CPT | Mod: 52

## 2025-07-31 PROCEDURE — 1036F TOBACCO NON-USER: CPT | Performed by: NURSE PRACTITIONER

## 2025-07-31 PROCEDURE — 99214 OFFICE O/P EST MOD 30 MIN: CPT | Performed by: NURSE PRACTITIONER

## 2025-07-31 PROCEDURE — 92557 COMPREHENSIVE HEARING TEST: CPT

## 2025-07-31 SDOH — ECONOMIC STABILITY: FOOD INSECURITY: WITHIN THE PAST 12 MONTHS, YOU WORRIED THAT YOUR FOOD WOULD RUN OUT BEFORE YOU GOT MONEY TO BUY MORE.: NEVER TRUE

## 2025-07-31 SDOH — ECONOMIC STABILITY: FOOD INSECURITY: WITHIN THE PAST 12 MONTHS, THE FOOD YOU BOUGHT JUST DIDN'T LAST AND YOU DIDN'T HAVE MONEY TO GET MORE.: NEVER TRUE

## 2025-07-31 ASSESSMENT — ENCOUNTER SYMPTOMS
DEPRESSION: 0
LOSS OF SENSATION IN FEET: 0
OCCASIONAL FEELINGS OF UNSTEADINESS: 0

## 2025-07-31 ASSESSMENT — LIFESTYLE VARIABLES
SKIP TO QUESTIONS 9-10: 1
HOW MANY STANDARD DRINKS CONTAINING ALCOHOL DO YOU HAVE ON A TYPICAL DAY: 1 OR 2
HOW OFTEN DO YOU HAVE SIX OR MORE DRINKS ON ONE OCCASION: NEVER
AUDIT-C TOTAL SCORE: 4
HOW OFTEN DO YOU HAVE A DRINK CONTAINING ALCOHOL: 4 OR MORE TIMES A WEEK

## 2025-07-31 ASSESSMENT — PAIN SCALES - GENERAL: PAINLEVEL_OUTOF10: 0-NO PAIN

## 2025-07-31 NOTE — PROGRESS NOTES
AUDIOLOGIC EVALUATION    HISTORY  Umu Haque, 74 y.o., was seen today, 7/31/2025, for a follow-up audiologic evaluation in conjunction with an evaluation with Leeanne Richard APRN.CNP; See same day encounter in the Ears Nose and Throat (ENT) department for additional information    The following case history was obtained from the patient during today's visit on 7/31/2025  Hearing Concerns? No concerns for changes   Tinnitus? Yes  Pulsatile tinnitus  Onset: About a month ago  Laterality: Right-sided   Otalgia? None reported at this time   Aural Pressure/Fullness? Occasionally   Patient reported she has spring time allergies, unsure if fullness related to weather   Recent Ear Infections/Otorrhea? None reported at this time   Dizziness? Yes  History of imbalance - e.g., walking into walls and door frames   Eventually pretty much resolved itself - very minor in comparison to initially   History of ear surgeries? None reported at this time   History of noise exposure? None reported at this time   Family history of hearing loss? None reported at this time   Other significant history? None reported at this time     ASSESSMENT  Otoscopy  Right Ear: Ear canal clear, tympanic membrane visualized.  Left Ear: Ear canal clear, tympanic membrane visualized.    Tympanometry (226 Hz):   Right Ear: Type A, middle ear pressure and tympanic membrane compliance within normal limits  Left Ear: Type A, middle ear pressure and tympanic membrane compliance within normal limits    Acoustic Reflexes:   (Probe) Right Ear: (Ipsilateral) Responses present at expected levels for 500-2000 Hz.  (Probe) Left Ear: (Ipsilateral) Responses present at expected levels for 500-4000 Hz.    DPOAEs (1,000-8,000 Hz Protocol)  Right Ear: Partially present. Responses present at 9885-1624 Hz, 1438-6925 Hz only.  Left Ear:  Partially present. Responses present at 3477-9392 Hz 5219-0191 Hz only.     Note: Present OAEs suggest normal or near normal  cochlear outer hair cell function for corresponding frequency region(s).   Absent OAEs with normal middle ear function can be consistent with some degree of hearing loss.     Pure Tone Audiometry:     Right Ear: Primarily normal hearing sensitivity from 125-8000 Hz (Response noted in mild hearing loss range at 3000 Hz only)  Left Ear: Normal hearing sensitivity from 125-4000 Hz sloping to a moderate, presumably sensorineural hearing loss through 8000 Hz.    Speech Audiometry:   Right Ear: Obtained at 10 dB HL; good agreement with pure tone average  Left Ear:  Obtained at 10 dB HL; good agreement with pure tone average    Speech Perception  Right Ear: excellent (96%) at 55 dB HL; based on [facility] Auditory Test No.6 (NU-6) Ordered by difficulty (N=10).  Left Ear: excellent (96%) at 60 dB HL; based on [facility] Auditory Test No.6 (NU-6) Ordered by difficulty (N=10).     Test technique: Conventional Audiometry via insert earphones.   Reliability:  good    Comparison of today's results with previous test results: Re: 3/21/2024  Essentially stable in both ears for frequencies tested at both visits    IMPRESSIONS  Primarily normal hearing sensitivity in the right ear (Response noted in mild hearing loss range at 3000 Hz only)  Normal sloping to moderate sensorineural hearing loss in the left ear  Normal middle ear status in both ears  Partially present DPOAEs in both ears    RECOMMENDATIONS  Follow-up with ENT/Otolaryngology, as scheduled for today  Return for annual hearing evaluation in 12 months. Call [phone] to schedule.     MALDONADO Rock, CCC-A  Clinical Audiologist    Time: [id number]  Today's results were discussed with patient and family. Patient reported understanding of today's results and agreement with care plan. Please see the audiogram for today's visit below.     AUDIOGRAM

## 2025-07-31 NOTE — PROGRESS NOTES
"Subjective   Patient ID: Umu Haque is a 74 y.o. female who presents for Tinnitus.    HPI  INITIAL VISIT 4/18/2024:  Issue with her right ear- itchy for years. Sees dermatology.   Weird, minor imbalance that she has had for months. Saw PCP in March. In December she had dental work done, bone graft. Dentist put her on an antibiotic and a steroid- then was put on Cipro. She started having imbalance after that. Was walking and would bump into things. Walking straight isn't an issue. When she has to turn to move in her house she is bumping into things, more on the right side. The balance is gradually improving. Denies vertigo, n/v. Slight sensation in her head, like a lightness. Went to vestibular PT- didn't see that. After PT, she was feeling motion sickness the rest of the night.   No cold or flu prior to this happening.   Bright lights and loud sounds don't make her feel dizzy.   No pressure induced dizziness. No autophony.   Turning does seem to be trigger the sensation. No spinning.   No headaches.   Pituitary adenoma in 2017- gets MRIs. Saw Dr. Roberts previously who said she no longer needed annual imaging unless she became symptomatic. Seeing neurology in July.     7/31/2025: Patient here for right pulsatile tinnitus that started ~ 1 month ago. She is not noticing it as much anymore. She would only hear it when she carolyne to lay down at night. She felt it was in sync with her heart. No change in her hearing. No new medications. No ear pain or drainage. No cold or flu prior to symptoms starting. Still gets some of the \"tickling\" in the right ear- still seeing dermatology for that. The ears seems to be flaking. The fluocinolone didn't help. No more dizziness.     Patient Active Problem List   Diagnosis    Arthritis of carpometacarpal (CMC) joint of right thumb    Coronary artery disease    Dense breast tissue    Effusion of left knee    Episodic lightheadedness    Fatty liver disease, nonalcoholic    Breast " mass    Fibrocystic disease of breast    Gallbladder polyp    History of basal cell carcinoma (BCC)    Hyperlipidemia    Hypothyroidism    Insomnia    Osteopenia    Pituitary adenoma (Multi)    Vitamin D deficiency    Balance problem    Near syncope    Cerebellar infarct (Multi)     Past Surgical History:   Procedure Laterality Date    APPENDECTOMY  09/24/2013    Appendectomy    BREAST BIOPSY Left 11/21/2013    Biopsy Breast Open    CATARACT EXTRACTION, BILATERAL Bilateral     MR HEAD ANGIO WO IV CONTRAST  06/15/2023    MR HEAD ANGIO WO IV CONTRAST 6/15/2023 [id number] MRI    OOPHORECTOMY  2000    OTHER SURGICAL HISTORY  09/24/2013    Oophorectomy Unilateral Left Side    OTHER SURGICAL HISTORY  04/01/2019    Excision of basal cell carcinoma    TONSILLECTOMY  09/24/2013    Tonsillectomy     Review of Systems    Objective   Physical Exam    Constitutional: No fever, chills, weight loss or weight gain  General appearance: Appears well, well-nourished, well groomed. No acute distress.    Communication: Normal communication    Psychiatric: Oriented to person, place and time. Normal mood and affect.    Neurologic: Cranial nerves II-XII grossly intact and symmetric bilaterally.    Head and Face:  Head: Atraumatic with no masses, lesions or scarring.  Face: Normal symmetry. No scars or deformities.  TMJ: Normal, no trismus.    Eyes: Conjunctiva not edematous or erythematous.     Right Ear: External inspection of ear with no deformity, scars, or masses. EAC is with mild cerumen that was removed using the microscope and alligator forceps.  TM is intact with no sign of infection, effusion, or retraction.  No perforation seen. No evidence of glomus tumor. No bruits auscultated preauricular or postauricular.      Left Ear: External inspection of ear with no deformity, scars, or masses. EAC is clear.  TM is intact with no sign of infection, effusion, or retraction.  No perforation seen.     Nose: External inspection of nose:  No nasal lesions, lacerations or scars. Anterior rhinoscopy with limited visualization past the inferior turbinates. No tenderness on frontal or maxillary sinus palpation.    Oral Cavity/Mouth: Oral cavity and oropharynx mucosa moist and pink. No lesions or masses. Dentition normal. Tonsils appear surgically removed. Uvula is midline. Tongue with no masses or lesions. Tongue with good mobility. The oropharynx is clear.    Neck: Normal appearing, symmetric, trachea midline.     Cardiovascular: Examination of peripheral vascular system shows no clubbing or cyanosis.    Respiratory: No respiratory distress increased work of breathing. Inspection of the chest with symmetric chest expansion and normal respiratory effort.    Skin: No head and neck rashes.    Lymph nodes: No adenopathy.     Diagnostic Results   I personally interpreted audiogram from today, 7/31/2025. My personal interpretation is essentially normal hearing with a drop at 8000 Hz in the left and a mild drop at 3000 Hz in the right ear.  Normal middle ear function and excellent word recognition.         I personally reviewed MRI brain without contrast report obtained on 4/8/2024:  IMPRESSION:  Findings of mild chronic small vessel ischemic changes and small old  infarct in the left cerebellum, otherwise unremarkable MRI of the  brain.    Assessment/Plan   Diagnoses and all orders for this visit:    Patient with one chronic condition and one undiagnosed new problem with uncertain prognosis    Ear itching  Pulsatile tinnitus, right  Excessive cerumen, right  High frequency SNHL, left    Right ear was cleaned. Audiogram was reviewed. Patient's pulsatile tinnitus has essentially resolved. At this time, I recommend observation. If the pulsatile tinnitus returns, I would recommend CTA to identify/rule out aneurysm, AVM, glomus tumor, carotid artery stenosis, etc.      Repeat audiogram annually to monitor hearing.    All questions answered to patient satisfaction.          Leeanne Richard, CHIP-CNP 07/31/25 4:24 PM

## 2025-08-12 ENCOUNTER — HOSPITAL ENCOUNTER (OUTPATIENT)
Dept: RADIOLOGY | Facility: CLINIC | Age: 75
Discharge: HOME | End: 2025-08-12
Payer: MEDICARE

## 2025-08-12 ENCOUNTER — OFFICE VISIT (OUTPATIENT)
Dept: ORTHOPEDIC SURGERY | Facility: CLINIC | Age: 75
End: 2025-08-12
Payer: MEDICARE

## 2025-08-12 VITALS — HEIGHT: 64 IN | WEIGHT: 142 LBS | BODY MASS INDEX: 24.24 KG/M2

## 2025-08-12 DIAGNOSIS — M25.512 LEFT SHOULDER PAIN, UNSPECIFIED CHRONICITY: ICD-10-CM

## 2025-08-12 DIAGNOSIS — M75.02 ADHESIVE CAPSULITIS OF LEFT SHOULDER: Primary | ICD-10-CM

## 2025-08-12 PROCEDURE — 99212 OFFICE O/P EST SF 10 MIN: CPT | Performed by: ORTHOPAEDIC SURGERY

## 2025-08-12 PROCEDURE — 99203 OFFICE O/P NEW LOW 30 MIN: CPT | Performed by: ORTHOPAEDIC SURGERY

## 2025-08-12 PROCEDURE — 1036F TOBACCO NON-USER: CPT | Performed by: ORTHOPAEDIC SURGERY

## 2025-08-12 PROCEDURE — 73030 X-RAY EXAM OF SHOULDER: CPT | Mod: LEFT SIDE | Performed by: RADIOLOGY

## 2025-08-12 PROCEDURE — 73030 X-RAY EXAM OF SHOULDER: CPT | Mod: LT

## 2025-08-12 PROCEDURE — 1160F RVW MEDS BY RX/DR IN RCRD: CPT | Performed by: ORTHOPAEDIC SURGERY

## 2025-08-12 PROCEDURE — 1159F MED LIST DOCD IN RCRD: CPT | Performed by: ORTHOPAEDIC SURGERY

## 2025-09-24 ENCOUNTER — APPOINTMENT (OUTPATIENT)
Dept: OBSTETRICS AND GYNECOLOGY | Facility: CLINIC | Age: 75
End: 2025-09-24
Payer: MEDICARE

## 2026-04-07 ENCOUNTER — APPOINTMENT (OUTPATIENT)
Dept: PRIMARY CARE | Facility: CLINIC | Age: 76
End: 2026-04-07
Payer: MEDICARE